# Patient Record
Sex: FEMALE | Race: OTHER | Employment: PART TIME | URBAN - METROPOLITAN AREA
[De-identification: names, ages, dates, MRNs, and addresses within clinical notes are randomized per-mention and may not be internally consistent; named-entity substitution may affect disease eponyms.]

---

## 2017-01-03 ENCOUNTER — OFFICE VISIT (OUTPATIENT)
Dept: GYNECOLOGY | Age: 47
End: 2017-01-03

## 2017-01-03 VITALS
SYSTOLIC BLOOD PRESSURE: 110 MMHG | DIASTOLIC BLOOD PRESSURE: 71 MMHG | BODY MASS INDEX: 30.48 KG/M2 | HEIGHT: 63 IN | HEART RATE: 66 BPM | WEIGHT: 172 LBS

## 2017-01-03 DIAGNOSIS — Z12.31 ENCOUNTER FOR SCREENING MAMMOGRAM FOR MALIGNANT NEOPLASM OF BREAST: Primary | ICD-10-CM

## 2017-01-03 DIAGNOSIS — C77.9 REGIONAL LYMPH NODE METASTASIS PRESENT (HCC): ICD-10-CM

## 2017-01-03 DIAGNOSIS — C54.1 ENDOMETRIAL CANCER (HCC): ICD-10-CM

## 2017-01-03 RX ORDER — LEVOTHYROXINE SODIUM 50 UG/1
TABLET ORAL
Refills: 1 | COMMUNITY
Start: 2016-12-02

## 2017-01-03 NOTE — PROGRESS NOTES
[address] [facility] [address]  [phone]  Dr. Kurt Bagley, III    Pastor Irish Peterson, [facility]   Office Note  Patient ID:  Name: Leeroy Patricia  MRM: [id number]  : 1970/46 y.o. Date: 1/3/2017    Diagnosis:     ICD-10-CM ICD-9-CM    1. Encounter for screening mammogram for malignant neoplasm of breast Z12.31 V76.12 [facility] MAMMO BI SCREENING INCL CAD   2. Endometrial cancer (HCC) C54.1 182.0    3. Regional lymph node metastasis present ([facility] [address] C77.9 196.9        Problem List:   Patient Active Problem List   Diagnosis Code    Abnormal uterine bleeding N93.9    Endometrial thickening on ultra sound R93.8    Endometrial cancer (HCC) C54.1     HISTORY:  55 y.o.  female with diagnosis of endometrial cancer. Original surgery was a TLH/BSO with peritoneal/pelvic biopsies on 11/9/15. She was optimally debulked.  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 Metastatic disease present at pelvic peritoneal biopsies. She is recommended adjuvant carboplatin/paclitaxel therapy (initiated 12/28/15) under Kurt Bagley MD.    Family Hx: Distant relative with splenic cancer.    No GI/, uterine, breast, ovarian, pancreatic cancer. Brother and sister, nieces/nephews. SUBJECTIVE:    Leeroy Patricia is a 55 y.o. female who presents for followup postoperative care following staging laparotomy, 2015,     The patient's pathology revealed   FINAL PATHOLOGIC DIAGNOSIS  1. Soft tissue, cul-de-sac nodule, excision:  Metastatic endometrial carcinoma  2.  Uterus, bilateral ovaries and fallopian tubes, hysterectomy and salpingo-oophorectomy:  Poorly differentiated endometrial carcinoma  See comment  Synoptic Report:  Specimen: Uterus, bilateral ovaries and fallopian tubes, soft tissue (cul-de-sac, peritoneum)  Procedure: Hysterectomy, bilateral salpingo-oophorectomy, peritoneal/serosal biopsies  Lymph node sampling: Not performed  Specimen integrity: Intact hysterectomy specimen  Tumor size: 6.0 cm  Histologic type: Endometrioid adenocarcinoma with extensive squamous differentiation  Histologic grade: FIGO grade 3  Myometrial invasion:  Depth of invasion: 12 mm  Myometrial thickness: 21 mm  Involvement of cervix: Not involved  Extent of involvement of other organs:  Right ovary: Involved  Left ovary: Involved  Right fallopian tube: Not involved  Left fallopian tube: Not involved  Cul-de-sac: Involved  Rectal serosa: Involved  Right and left pelvic sidewalls: Involved  Lymphovascular invasion: Not identified  Additional pathologic findings: Focal endometriosis, leiomyomas  Pathologic staging (pTNM):  Primary tumor (pT): pT3a  Regional lymph nodes (pN): pNX  Distant metastasis (M): Not applicable  3. Rectal serosa, nodule, biopsy:  Metastatic endometrial carcinoma  4. Soft tissue, right pelvic sidewall, biopsy:  Metastatic endometrial carcinoma  5. Soft tissue, left pelvic sidewall, biopsy:  Single focus of metastatic endometrial carcinoma  Comment  Stains performed on the original biopsy specimen ([id number]) show that the tumor is positive for estrogen receptor,  vimentin and CK7. In addition, it also shows strong diffuse positivity for p16. This finding raised the possibility of a  poorly differentiated endocervical adenocarcinoma; however, findings in the hysterectomy specimen show benign,  uninvolved cervix with a large 6.0 cm endometrial mass showing deep invasion into the myometrium. The majority of  the tumor shows extensive squamous differentiation with marked cellular atypia and increased mitotic activity. Focally there are areas of more conventional endometrioid endometrial carcinoma. Extensive bilateral involvement of  both ovaries is present as well as metastases in the cul-de-sac, rectal serosa and right and left pelvic sidewalls.     S/P Cycle #6/6 completed, tolerated well, peripheral neuropathy complaints? ?  S/P RT    EOD CT: 5/2016  FINDINGS:  The visualized portions of the lung bases are clear.     There are no focal abnormalities within the liver, spleen, pancreas, adrenal    glands or kidneys.     The aorta tapers without aneurysm. There is no retroperitoneal adenopathy or    mass.     The bowel is normal. The appendix is normal. There is no ascites or free    intraperitoneal air.      There is no pelvic mass or adenopathy. Patient is status post hysterectomy    nephrectomy. No adenopathy is identified.     The delayed images demonstrate good bilateral excretion of contrast into the    renal collecting systems.           IMPRESSION: No acute abnormality identified. There is no evidence of    adenopathy  Currently she has no problems with eating, bowel movements, voiding, or their wound  Appetite is good. Eating a regular diet without difficulty. Bowel movements are regular. Medications:     Current Outpatient Prescriptions on File Prior to Visit   Medication Sig Dispense Refill    acetaminophen (TYLENOL) 325 mg tablet Take 650 mg by mouth every four (4) hours as needed for Pain.  PRENATAL VIT CALC,IRON,FOLIC (PRENATAL VITAMIN PO) Take  by mouth daily.  levothyroxine (SYNTHROID) 75 mcg tablet Take 75 mcg by mouth Daily (before breakfast). No current facility-administered medications on file prior to visit. Allergies: Allergies   Allergen Reactions    Other Food Shortness of Breath and Swelling     mushrooms    Advil [Ibuprofen] Other (comments)     \"headache\"    Mushroom Combination No.1 Shortness of Breath and Swelling    Shellfish Derived Other (comments)     Dizziness headache.      Past Medical History   Diagnosis Date    Asthma      2013 had wheezing and used inhaler, no problems since    Hypothyroid     [facility]terine cancer [facility])      Past Surgical History   Procedure Laterality Date    Hx gyn  2012 \"surgery for hematoma near uterus\" laparoscopic    Hx salpingo-oophorectomy  11/2015     BILATERAL    [facility]  11/[facility]     Social History     Social History    Marital status:      Spouse name: N/A    Number of children: N/A    Years of education: N/A     Occupational History    Not on file. Social History Main Topics    Smoking status: Never Smoker    Smokeless tobacco: Never Used    Alcohol use No    Drug use: No    Sexual activity: Not on file     Other Topics Concern    Not on file     Social History Narrative       OBJECTIVE:    Vitals:   Vitals:    01/03/17 1504   BP: 110/71   Pulse: 66   Weight: 172 lb (78 kg)   Height: 5' 3\" (1.6 m)     Physical Examination:     GeneraL:  Nodes: HEENT:  alert, cooperative, no distress  Negative throughout  Normocephalic, no JVD or thyroid nodularity   Lungs: lungs clear to auscultation and percussion   Cardiac: Regular rate and rhythm   Abdomen: soft, bowel sounds active, non-tender  No CVA tenderness. No fluid wave or organomegaly   Incision: healing well   Pelvic: Exam deferred/refused. Rectal: not done   Extremity:   extremities normal, atraumatic, no cyanosis or edema     IMPRESSION:    Marquis Player is doing well   She has a working diagnosis of     ICD-10-CM ICD-9-CM    1. Encounter for screening mammogram for malignant neoplasm of breast Z12.31 V76.12 [facility] MAMMO BI SCREENING INCL CAD   2. Endometrial cancer (HCC) C54.1 182.0    3. Regional lymph node metastasis present ([facility] [address] C77.9 196.9         Medical problems:     Patient Active Problem List   Diagnosis Code    Abnormal uterine bleeding N93.9    Endometrial thickening on ultra sound R93.8    Endometrial cancer (HCC) C54.1       PLAN:    The operative procedures and clinical results have been reviewed with the patient. Implications of diagnosis discussed at length. All questions answered.    Initial Consultation/Recommendations:  I have continued to recommend a six cycle protocol of IV Carbo/Taxol in view of the metastatic nature of the patient's disease. Rationale, risks and toxicities noted. I furthermore advised the patient of the possible post-chemotherapy recommendations for pelvic ERT. S/P IV Carbo/Taxol x 6  S/P RT, finished three month ago    Return two months. Hold     Rationale, risks discussed    Return two months or PRN    Virlinda Bamberger, MD  1/3/2017/2:16 PM    Defined Sensitive Document    CC:   Omar Chapman MD   No ref.  provider found

## 2017-01-03 NOTE — PROGRESS NOTES
2 month check up, pt reports no abnormal spotting or bleeding, pt states she has no questions or concerns for today's visit

## 2017-01-03 NOTE — PATIENT INSTRUCTIONS
Source4Style Activation    Thank you for requesting access to Source4Style. Please follow the instructions below to securely access and download your online medical record. Source4Style allows you to send messages to your doctor, view your test results, renew your prescriptions, schedule appointments, and more. How Do I Sign Up? 1. In your internet browser, go to [email or web address]. 2. Click on the First Time User? Click Here link in the Sign In box. You will see the New Member Sign Up page. 3. Enter your Source4Style Access Code exactly as it appears below. You will not need to use this code after youve completed the sign-up process. If you do not sign up before the expiration date, you must request a new code. Source4Style Access Code: [email or web address]  Expires: 4/3/2017  2:54 PM (This is the date your Source4Style access code will )    4. Enter the last four digits of your Social Security Number (xxxx) and Date of Birth (mm/dd/yyyy) as indicated and click Submit. You will be taken to the next sign-up page. 5. Create a Source4Style ID. This will be your Source4Style login ID and cannot be changed, so think of one that is secure and easy to remember. 6. Create a Source4Style password. You can change your password at any time. 7. Enter your Password Reset Question and Answer. This can be used at a later time if you forget your password. 8. Enter your e-mail address. You will receive e-mail notification when new information is available in [address] 9. Click Sign Up. You can now view and download portions of your medical record. 10. Click the Download Summary menu link to download a portable copy of your medical information. Additional Information    If you have questions, please visit the Frequently Asked Questions section of the Source4Style website at [email or web address]. [employer]/. Remember, Source4Style is NOT to be used for urgent needs. For medical emergencies, dial 911.

## 2017-01-16 ENCOUNTER — APPOINTMENT (OUTPATIENT)
Dept: INFUSION THERAPY | Age: 47
End: 2017-01-16

## 2017-02-14 ENCOUNTER — TELEPHONE (OUTPATIENT)
Dept: GYNECOLOGY | Age: 47
End: 2017-02-14

## 2017-02-14 DIAGNOSIS — R92.1 BREAST CALCIFICATIONS ON MAMMOGRAM: Primary | ICD-10-CM

## 2017-02-14 NOTE — TELEPHONE ENCOUNTER
Anna from [address]  called and stated pt needs an order for a left diagnostic mmg and a left breast ultrasound for calcifications.  Faxed to [phone]

## 2017-04-04 ENCOUNTER — OFFICE VISIT (OUTPATIENT)
Dept: GYNECOLOGY | Age: 47
End: 2017-04-04

## 2017-04-04 VITALS
DIASTOLIC BLOOD PRESSURE: 63 MMHG | WEIGHT: 163.8 LBS | HEIGHT: 63 IN | BODY MASS INDEX: 29.02 KG/M2 | SYSTOLIC BLOOD PRESSURE: 108 MMHG | HEART RATE: 62 BPM

## 2017-04-04 DIAGNOSIS — C54.1 ENDOMETRIAL CA (HCC): Primary | ICD-10-CM

## 2017-04-04 NOTE — PROGRESS NOTES
3 month check up, pt complains of left sided pelvic pain that comes and goes and radiates to the back, 1/10 on pain scale

## 2017-04-04 NOTE — PROGRESS NOTES
[address], [address], [address]  [phone]  Dr. Lyly Leo, III    Dr. Jaime Monroe, [facility]   Office Note  Patient ID:  Name: Viridiana Salazar  MRM: [id number]  : 1970/47 y.o. Date: 2017    Diagnosis:     ICD-10-CM ICD-9-CM    1. Endometrial ca ([address] C54.1 182.0        Problem List:   Patient Active Problem List   Diagnosis Code    Abnormal uterine bleeding N93.9    Endometrial thickening on ultra sound R93.8    Endometrial cancer (HCC) C54.1     HISTORY:  55 y.o.  female with diagnosis of endometrial cancer. Original surgery was a TLH/BSO with peritoneal/pelvic biopsies on 11/9/15. She was optimally debulked.  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 Metastatic disease present at pelvic peritoneal biopsies. She is recommended adjuvant carboplatin/paclitaxel therapy (initiated 12/28/15) under Lyly Leo MD.    Family Hx: Distant relative with splenic cancer.    No GI/, uterine, breast, ovarian, pancreatic cancer. Brother and sister, nieces/nephews. SUBJECTIVE:    Viridiana Salazar is a 52 y.o. female who presents for followup postoperative care following staging laparotomy, 2015,     The patient's pathology revealed   FINAL PATHOLOGIC DIAGNOSIS  1. Soft tissue, cul-de-sac nodule, excision:  Metastatic endometrial carcinoma  2.  Uterus, bilateral ovaries and fallopian tubes, hysterectomy and salpingo-oophorectomy:  Poorly differentiated endometrial carcinoma  See comment  Synoptic Report:  Specimen: Uterus, bilateral ovaries and fallopian tubes, soft tissue (cul-de-sac, peritoneum)  Procedure: Hysterectomy, bilateral salpingo-oophorectomy, peritoneal/serosal biopsies  Lymph node sampling: Not performed  Specimen integrity: Intact hysterectomy specimen  Tumor size: 6.0 cm  Histologic type: Endometrioid adenocarcinoma with extensive squamous differentiation  Histologic grade: FIGO grade 3  Myometrial invasion:  Depth of invasion: 12 mm  Myometrial thickness: 21 mm  Involvement of cervix: Not involved  Extent of involvement of other organs:  Right ovary: Involved  Left ovary: Involved  Right fallopian tube: Not involved  Left fallopian tube: Not involved  Cul-de-sac: Involved  Rectal serosa: Involved  Right and left pelvic sidewalls: Involved  Lymphovascular invasion: Not identified  Additional pathologic findings: Focal endometriosis, leiomyomas  Pathologic staging (pTNM):  Primary tumor (pT): pT3a  Regional lymph nodes (pN): pNX  Distant metastasis (M): Not applicable  3. Rectal serosa, nodule, biopsy:  Metastatic endometrial carcinoma  4. Soft tissue, right pelvic sidewall, biopsy:  Metastatic endometrial carcinoma  5. Soft tissue, left pelvic sidewall, biopsy:  Single focus of metastatic endometrial carcinoma  Comment  Stains performed on the original biopsy specimen ([id number]) show that the tumor is positive for estrogen receptor,  vimentin and CK7. In addition, it also shows strong diffuse positivity for p16. This finding raised the possibility of a  poorly differentiated endocervical adenocarcinoma; however, findings in the hysterectomy specimen show benign,  uninvolved cervix with a large 6.0 cm endometrial mass showing deep invasion into the myometrium. The majority of  the tumor shows extensive squamous differentiation with marked cellular atypia and increased mitotic activity. Focally there are areas of more conventional endometrioid endometrial carcinoma. Extensive bilateral involvement of  both ovaries is present as well as metastases in the cul-de-sac, rectal serosa and right and left pelvic sidewalls. S/P Cycle #6/6 completed, tolerated well, peripheral neuropathy complaints? ?  S/P RT    EOD CT: 5/2016  FINDINGS:  The visualized portions of the lung bases are clear.     There are no focal abnormalities within the liver, spleen, pancreas, adrenal    glands or kidneys.     The aorta tapers without aneurysm. There is no retroperitoneal adenopathy or    mass.     The bowel is normal. The appendix is normal. There is no ascites or free    intraperitoneal air.      There is no pelvic mass or adenopathy. Patient is status post hysterectomy    nephrectomy. No adenopathy is identified.     The delayed images demonstrate good bilateral excretion of contrast into the    renal collecting systems.           IMPRESSION: No acute abnormality identified. There is no evidence of    adenopathy  Currently she has no problems with eating, bowel movements, voiding, or their wound  Appetite is good. Eating a regular diet without difficulty. Bowel movements are regular. The patient does complain of LUQ pain, episodic/ associated ipsilateral back pain. No fever/chills  Negative  or GI dysfunction or association. Medications:     Current Outpatient Prescriptions on File Prior to Visit   Medication Sig Dispense Refill    levothyroxine (SYNTHROID) 50 mcg tablet TAKE 1 TABLET BY MOUTH EVERY DAY  1     No current facility-administered medications on file prior to visit. Allergies: Allergies   Allergen Reactions    Other Food Shortness of Breath and Swelling     mushrooms    Advil [Ibuprofen] Other (comments)     \"headache\"    Mushroom Combination No.1 Shortness of Breath and Swelling    Shellfish Derived Other (comments)     Dizziness headache.      Past Medical History:   Diagnosis Date    Asthma     2013 had wheezing and used inhaler, no problems since    Hypothyroid     Uterine cancer [facility])      Past Surgical History:   Procedure Laterality Date    HX GYN  2012    \"surgery for hematoma near uterus\" laparoscopic    HX HYSTERECTOMY  11/2015    TLH    HX SALPINGO-OOPHORECTOMY  11/2015    BILATERAL     Social History     Social History    Marital status:      Spouse name: N/A    Number of children: N/A    Years of education: N/A     Occupational History    Not on file. Social History Main Topics    Smoking status: Never Smoker    Smokeless tobacco: Never Used    Alcohol use No    Drug use: No    Sexual activity: Not on file     Other Topics Concern    Not on file     Social History Narrative       OBJECTIVE:    Vitals:   Vitals:    04/04/17 1518   BP: 108/63   Pulse: 62   Weight: 163 lb 12.8 oz (74.3 kg)   Height: 5' 3\" (1.6 m)     Physical Examination:     GeneraL:  Nodes: HEENT:  alert, cooperative, no distress  Negative throughout  Normocephalic, no JVD or thyroid nodularity   Lungs: lungs clear to auscultation and percussion   Cardiac: Regular rate and rhythm   Abdomen: soft, bowel sounds active, non-tender  No CVA tenderness. No fluid wave or organomegaly   Incision: healing well, no hernia   Pelvic: Vulva: No gross lesion  Vagina: Narrower introitus, no palpable masses/nodularity by bimanual examination. Limited examination   Rectal: not done   Extremity:   extremities normal, atraumatic, no cyanosis or edema     IMPRESSION:    Lj De Anda is doing well   She has a working diagnosis of     ICD-10-CM ICD-9-CM    1. [facility] C54.1 182.0         Medical problems:     Patient Active Problem List   Diagnosis Code    Abnormal uterine bleeding N93.9    Endometrial thickening on ultra sound R93.8    Endometrial cancer (HCC) C54.1       PLAN:      S/P IV Carbo/Taxol x 6  S/P RT, finished three month ago    With respect to the complaints of LUQ pain/back will get EOD CT  CT to be scheduled  Followup appointment    Josephine Renee MD  4/4/2017/2:16 PM    Defined Sensitive Document    CC:   Roberth Del Cid MD   No ref.  provider found

## 2017-04-04 NOTE — PATIENT INSTRUCTIONS
FromUs Activation    Thank you for requesting access to FromUs. Please follow the instructions below to securely access and download your online medical record. FromUs allows you to send messages to your doctor, view your test results, renew your prescriptions, schedule appointments, and more. How Do I Sign Up? 1. In your internet browser, go to [email or web address]. 2. Click on the First Time User? Click Here link in the Sign In box. You will see the New Member Sign Up page. 3. Enter your FromUs Access Code exactly as it appears below. You will not need to use this code after youve completed the sign-up process. If you do not sign up before the expiration date, you must request a new code. FromUs Access Code: [email or web address]  Expires: 7/3/2017  3:14 PM (This is the date your FromUs access code will )    4. Enter the last four digits of your Social Security Number (xxxx) and Date of Birth (mm/dd/yyyy) as indicated and click Submit. You will be taken to the next sign-up page. 5. Create a FromUs ID. This will be your FromUs login ID and cannot be changed, so think of one that is secure and easy to remember. 6. Create a FromUs password. You can change your password at any time. 7. Enter your Password Reset Question and Answer. This can be used at a later time if you forget your password. 8. Enter your e-mail address. You will receive e-mail notification when new information is available in [address] 9. Click Sign Up. You can now view and download portions of your medical record. 10. Click the Download Summary menu link to download a portable copy of your medical information. Additional Information    If you have questions, please visit the Frequently Asked Questions section of the FromUs website at [email or web address]/. Remember, FromUs is NOT to be used for urgent needs. For medical emergencies, dial 911.

## 2017-04-12 ENCOUNTER — HOSPITAL ENCOUNTER (OUTPATIENT)
Dept: CT IMAGING | Age: 47
Discharge: HOME | End: 2017-04-12
Attending: OBSTETRICS & GYNECOLOGY
Payer: SELF-PAY

## 2017-04-12 DIAGNOSIS — C54.1 ENDOMETRIAL CA (HCC): ICD-10-CM

## 2017-04-12 PROCEDURE — 74011000258 HC RX REV CODE- 258: Performed by: OBSTETRICS & GYNECOLOGY

## 2017-04-12 PROCEDURE — 74011636320 HC RX REV CODE- 636/320: Performed by: OBSTETRICS & GYNECOLOGY

## 2017-04-12 PROCEDURE — 74177 CT ABD & PELVIS W/CONTRAST: CPT

## 2017-04-12 RX ORDER — SODIUM CHLORIDE 0.9 % (FLUSH) 0.9 %
10 SYRINGE (ML) INJECTION
Status: COMPLETED | OUTPATIENT
Start: 2017-04-12 | End: 2017-04-12

## 2017-04-12 RX ADMIN — SODIUM CHLORIDE 100 ML: 900 INJECTION, SOLUTION INTRAVENOUS at 17:50

## 2017-04-12 RX ADMIN — Medication 10 ML: at 17:50

## 2017-04-12 RX ADMIN — IOHEXOL 50 ML: 240 INJECTION, SOLUTION INTRATHECAL; INTRAVASCULAR; INTRAVENOUS; ORAL at 17:50

## 2017-04-12 RX ADMIN — IOPAMIDOL 100 ML: 755 INJECTION, SOLUTION INTRAVENOUS at 17:50

## 2017-04-18 ENCOUNTER — OFFICE VISIT (OUTPATIENT)
Dept: GYNECOLOGY | Age: 47
End: 2017-04-18

## 2017-04-18 VITALS
BODY MASS INDEX: 28.98 KG/M2 | SYSTOLIC BLOOD PRESSURE: 114 MMHG | HEIGHT: 63 IN | HEART RATE: 61 BPM | WEIGHT: 163.58 LBS | DIASTOLIC BLOOD PRESSURE: 74 MMHG

## 2017-04-18 DIAGNOSIS — C54.1 ENDOMETRIAL CA (HCC): Primary | ICD-10-CM

## 2017-04-18 NOTE — PROGRESS NOTES
27 [facility] Mathias Moritz [address]  [phone]  Dr. Matt Barboza, III    Dr. Allegra Weber, Akbar Head, [facility]   Office Note  Patient ID:  Name: Tommie Almanza  MRM: [id number]  : 1970/47 y.o. Date: 2017    Diagnosis:     ICD-10-CM ICD-9-CM    1. Endometrial ca ([address] C54.1 182.0        Problem List:   Patient Active Problem List   Diagnosis Code    Abnormal uterine bleeding N93.9    Endometrial thickening on ultra sound R93.8    Endometrial cancer (HCC) C54.1     HISTORY:  55 y.o.  female with diagnosis of endometrial cancer. Original surgery was a TLH/BSO with peritoneal/pelvic biopsies on 11/9/15. She was optimally debulked.  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 Metastatic disease present at pelvic peritoneal biopsies. She is recommended adjuvant carboplatin/paclitaxel therapy (initiated 12/28/15) under Matt Barboza MD.    Family Hx: Distant relative with splenic cancer.    No GI/, uterine, breast, ovarian, pancreatic cancer. Brother and sister, nieces/nephews. SUBJECTIVE:    Tommie Almanza is a 52 y.o. female who presents for followup postoperative care following staging laparotomy, 2015,     The patient's pathology revealed   FINAL PATHOLOGIC DIAGNOSIS  1. Soft tissue, cul-de-sac nodule, excision:  Metastatic endometrial carcinoma  2.  Uterus, bilateral ovaries and fallopian tubes, hysterectomy and salpingo-oophorectomy:  Poorly differentiated endometrial carcinoma  See comment  Synoptic Report:  Specimen: Uterus, bilateral ovaries and fallopian tubes, soft tissue (cul-de-sac, peritoneum)  Procedure: Hysterectomy, bilateral salpingo-oophorectomy, peritoneal/serosal biopsies  Lymph node sampling: Not performed  Specimen integrity: Intact hysterectomy specimen  Tumor size: 6.0 cm  Histologic type: Endometrioid adenocarcinoma with extensive squamous differentiation  Histologic grade: FIGO grade 3  Myometrial invasion:  Depth of invasion: 12 mm  Myometrial thickness: 21 mm  Involvement of cervix: Not involved  Extent of involvement of other organs:  Right ovary: Involved  Left ovary: Involved  Right fallopian tube: Not involved  Left fallopian tube: Not involved  Cul-de-sac: Involved  Rectal serosa: Involved  Right and left pelvic sidewalls: Involved  Lymphovascular invasion: Not identified  Additional pathologic findings: Focal endometriosis, leiomyomas  Pathologic staging (pTNM):  Primary tumor (pT): pT3a  Regional lymph nodes (pN): pNX  Distant metastasis (M): Not applicable  3. Rectal serosa, nodule, biopsy:  Metastatic endometrial carcinoma  4. Soft tissue, right pelvic sidewall, biopsy:  Metastatic endometrial carcinoma  5. Soft tissue, left pelvic sidewall, biopsy:  Single focus of metastatic endometrial carcinoma  Comment  Stains performed on the original biopsy specimen ([id number]) show that the tumor is positive for estrogen receptor,  vimentin and CK7. In addition, it also shows strong diffuse positivity for p16. This finding raised the possibility of a  poorly differentiated endocervical adenocarcinoma; however, findings in the hysterectomy specimen show benign,  uninvolved cervix with a large 6.0 cm endometrial mass showing deep invasion into the myometrium. The majority of  the tumor shows extensive squamous differentiation with marked cellular atypia and increased mitotic activity. Focally there are areas of more conventional endometrioid endometrial carcinoma. Extensive bilateral involvement of  both ovaries is present as well as metastases in the cul-de-sac, rectal serosa and right and left pelvic sidewalls. S/P Cycle #6/6 completed, tolerated well, peripheral neuropathy complaints? ?  S/P RT  EOD CT:   3/2017   BRONSON    Currently she has no problems with eating, bowel movements, voiding, or their wound  Appetite is good. Eating a regular diet without difficulty. Bowel movements are regular. The patient does complain of LUQ pain, episodic/ associated ipsilateral back pain. No fever/chills  Negative  or GI dysfunction or association. Medications:     Current Outpatient Prescriptions on File Prior to Visit   Medication Sig Dispense Refill    levothyroxine (SYNTHROID) 50 mcg tablet TAKE 1 TABLET BY MOUTH EVERY DAY  1     No current facility-administered medications on file prior to visit. Allergies: Allergies   Allergen Reactions    Other Food Shortness of Breath and Swelling     mushrooms    Advil [Ibuprofen] Other (comments)     \"headache\"    Mushroom Combination No.1 Shortness of Breath and Swelling    Shellfish Derived Other (comments)     Dizziness headache. Past Medical History:   Diagnosis Date    Asthma     2013 had wheezing and used inhaler, no problems since    Hypothyroid     Uterine cancer [facility])      Past Surgical History:   Procedure Laterality Date    HX GYN  2012    \"surgery for hematoma near uterus\" laparoscopic    HX HYSTERECTOMY  11/2015    TLH    HX SALPINGO-OOPHORECTOMY  11/2015    BILATERAL     Social History     Social History    Marital status:      Spouse name: N/A    Number of children: N/A    Years of education: N/A     Occupational History    Not on file. Social History Main Topics    Smoking status: Never Smoker    Smokeless tobacco: Never Used    Alcohol use No    Drug use: No    Sexual activity: Not on file     Other Topics Concern    Not on file     Social History Narrative       OBJECTIVE:    Vitals:   Vitals:    04/18/17 1543   BP: 114/74   Pulse: 61   Weight: 163 lb 9.3 oz (74.2 kg)   Height: 5' 3\" (1.6 m)     Physical Examination:     GeneraL:  Nodes:   HEENT:  alert, cooperative, no distress  Negative throughout  Normocephalic, no JVD or thyroid nodularity   Lungs: lungs clear to auscultation and percussion   Cardiac: Regular rate and rhythm   Abdomen: soft, bowel sounds active, non-tender  No CVA tenderness. No fluid wave or organomegaly   Incision: healing well, no hernia   Pelvic: Vulva: No gross lesion  Vagina: Narrower introitus, no palpable masses/nodularity by bimanual examination. Limited examination   Rectal: not done   Extremity:   extremities normal, atraumatic, no cyanosis or edema     IMPRESSION:    Matias Alessia is doing well   She has a working diagnosis of     ICD-10-CM ICD-9-CM    1. Endometrial ca ([address] C54.1 182.0         Medical problems:     Patient Active Problem List   Diagnosis Code    Abnormal uterine bleeding N93.9    Endometrial thickening on ultra sound R93.8    Endometrial cancer (HCC) C54.1       PLAN:      S/P IV Carbo/Taxol x 6  S/P RT, finished three month ago    CT: BRONSON  Return 2 months or PRN  EOD scan PRN    Jeannie Landsi MD  4/18/2017/2:16 PM    Defined Sensitive Document    CC:   Dru Jason MD   No ref.  provider found

## 2017-06-20 ENCOUNTER — OFFICE VISIT (OUTPATIENT)
Dept: GYNECOLOGY | Age: 47
End: 2017-06-20

## 2017-06-20 VITALS
BODY MASS INDEX: 28.74 KG/M2 | HEART RATE: 77 BPM | DIASTOLIC BLOOD PRESSURE: 64 MMHG | HEIGHT: 63 IN | WEIGHT: 162.2 LBS | SYSTOLIC BLOOD PRESSURE: 93 MMHG

## 2017-06-20 DIAGNOSIS — Z85.42 HISTORY OF ENDOMETRIAL CANCER: Primary | ICD-10-CM

## 2017-06-20 NOTE — PROGRESS NOTES
27 [facility] Mathias Moritz [address], [address]  [phone]  Dr. Bladimir Jacobs, III    Dr. Arnol Oconnell, Olivia Garcia, [facility]   Office Note  Patient ID:  Name: Karey Trujillo  MRM: [id number]  : 1970/47 y.o. Date: 2017    Diagnosis:     ICD-10-CM ICD-9-CM    1. History of endometrial cancer Z85.42 V10.42        Problem List:   Patient Active Problem List   Diagnosis Code    Abnormal uterine bleeding N93.9    Endometrial thickening on ultra sound R93.8    Endometrial cancer (HCC) C54.1     HISTORY:  55 y.o.  female with diagnosis of endometrial cancer. Original surgery was a TLH/BSO with peritoneal/pelvic biopsies on 11/9/15. She was optimally debulked.  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 Metastatic disease present at pelvic peritoneal biopsies. She is recommended adjuvant carboplatin/paclitaxel therapy (initiated 12/28/15) under Bladimir Jacobs MD.    Family Hx: Distant relative with splenic cancer.    No GI/, uterine, breast, ovarian, pancreatic cancer. Brother and sister, nieces/nephews. SUBJECTIVE:    Karey Trujillo is a 52 y.o. female who presents for followup  care following staging laparotomy, 2015,     The patient's pathology revealed   FINAL PATHOLOGIC DIAGNOSIS  1. Soft tissue, cul-de-sac nodule, excision:  Metastatic endometrial carcinoma  2.  Uterus, bilateral ovaries and fallopian tubes, hysterectomy and salpingo-oophorectomy:  Poorly differentiated endometrial carcinoma  See comment  Synoptic Report:  Specimen: Uterus, bilateral ovaries and fallopian tubes, soft tissue (cul-de-sac, peritoneum)  Procedure: Hysterectomy, bilateral salpingo-oophorectomy, peritoneal/serosal biopsies  Lymph node sampling: Not performed  Specimen integrity: Intact hysterectomy specimen  Tumor size: 6.0 cm  Histologic type: Endometrioid adenocarcinoma with extensive squamous differentiation  Histologic grade: FIGO grade 3  Myometrial invasion:  Depth of invasion: 12 mm  Myometrial thickness: 21 mm  Involvement of cervix: Not involved  Extent of involvement of other organs:  Right ovary: Involved  Left ovary: Involved  Right fallopian tube: Not involved  Left fallopian tube: Not involved  Cul-de-sac: Involved  Rectal serosa: Involved  Right and left pelvic sidewalls: Involved  Lymphovascular invasion: Not identified  Additional pathologic findings: Focal endometriosis, leiomyomas  Pathologic staging (pTNM):  Primary tumor (pT): pT3a  Regional lymph nodes (pN): pNX  Distant metastasis (M): Not applicable  3. Rectal serosa, nodule, biopsy:  Metastatic endometrial carcinoma  4. Soft tissue, right pelvic sidewall, biopsy:  Metastatic endometrial carcinoma  5. Soft tissue, left pelvic sidewall, biopsy:  Single focus of metastatic endometrial carcinoma  Comment  Stains performed on the original biopsy specimen ([id number]) show that the tumor is positive for estrogen receptor,  vimentin and CK7. In addition, it also shows strong diffuse positivity for p16. This finding raised the possibility of a  poorly differentiated endocervical adenocarcinoma; however, findings in the hysterectomy specimen show benign,  uninvolved cervix with a large 6.0 cm endometrial mass showing deep invasion into the myometrium. The majority of  the tumor shows extensive squamous differentiation with marked cellular atypia and increased mitotic activity. Focally there are areas of more conventional endometrioid endometrial carcinoma. Extensive bilateral involvement of  both ovaries is present as well as metastases in the cul-de-sac, rectal serosa and right and left pelvic sidewalls. S/P Cycle #6/6 completed, tolerated well, peripheral neuropathy complaints? ?  S/P RT  EOD CT:   3/2017   BRONSON    6/20/2017:  Currently she has no problems with eating, bowel movements, voiding, or their wound  Appetite is good. Eating a regular diet without difficulty. Bowel movements are regular. The patient does complain of LUQ pain, episodic/ associated ipsilateral back pain. No fever/chills  Negative  or GI dysfunction or association. Medications:     Current Outpatient Prescriptions on File Prior to Visit   Medication Sig Dispense Refill    levothyroxine (SYNTHROID) 50 mcg tablet TAKE 1 TABLET BY MOUTH EVERY DAY  1     No current facility-administered medications on file prior to visit. Allergies: Allergies   Allergen Reactions    Other Food Shortness of Breath and Swelling     mushrooms    Advil [Ibuprofen] Other (comments)     \"headache\"    Mushroom Combination No.1 Shortness of Breath and Swelling    Shellfish Derived Other (comments)     Dizziness headache. Past Medical History:   Diagnosis Date    Asthma     2013 had wheezing and used inhaler, no problems since    Hypothyroid     Uterine cancer [facility])      Past Surgical History:   Procedure Laterality Date    HX GYN  2012    \"surgery for hematoma near uterus\" laparoscopic    HX HYSTERECTOMY  11/2015    TLH    HX SALPINGO-OOPHORECTOMY  11/2015    BILATERAL     Social History     Social History    Marital status:      Spouse name: N/A    Number of children: N/A    Years of education: N/A     Occupational History    Not on file. Social History Main Topics    Smoking status: Never Smoker    Smokeless tobacco: Never Used    Alcohol use No    Drug use: No    Sexual activity: Not on file     Other Topics Concern    Not on file     Social History Narrative       OBJECTIVE:    Vitals:   Vitals:    06/20/17 1520   BP: 93/64   Pulse: 77   Weight: 162 lb 3.2 oz (73.6 kg)   Height: 5' 3\" (1.6 m)     Physical Examination:     GeneraL:  Nodes:   HEENT:  alert, cooperative, no distress  Negative throughout  Normocephalic, no JVD or thyroid nodularity   Lungs: lungs clear to auscultation and percussion   Cardiac: Regular rate and rhythm   Abdomen: soft, bowel sounds active, non-tender  No CVA tenderness. No fluid wave or organomegaly   Incision: healed well, no hernia   Pelvic: Refused   Rectal: not done   Extremity:   extremities normal, atraumatic, no cyanosis or edema     IMPRESSION:    Qamar Long is doing well   She has a working diagnosis of     ICD-10-CM ICD-9-CM    1. History of endometrial cancer Z85.42 V10.42         Medical problems:     Patient Active Problem List   Diagnosis Code    Abnormal uterine bleeding N93.9    Endometrial thickening on ultra sound R93.8    Endometrial cancer (HCC) C54.1       PLAN:      S/P IV Carbo/Taxol x 6  S/P RT    CT: BRONSON  Return 2-3months or PRN  EOD scan PRN    Billy Avalos MD  6/20/2017/2:16 PM    Defined Sensitive Document    CC:   Orlando Lucas MD   No ref.  provider found

## 2017-06-20 NOTE — PROGRESS NOTES
2 month check up, pt states she has no questions or concerns for today's visit, pt reports no abnormal spotting or bleeding

## 2017-09-05 ENCOUNTER — OFFICE VISIT (OUTPATIENT)
Dept: GYNECOLOGY | Age: 47
End: 2017-09-05

## 2017-09-05 VITALS
HEART RATE: 59 BPM | BODY MASS INDEX: 29.06 KG/M2 | SYSTOLIC BLOOD PRESSURE: 104 MMHG | DIASTOLIC BLOOD PRESSURE: 70 MMHG | WEIGHT: 164 LBS | HEIGHT: 63 IN

## 2017-09-05 DIAGNOSIS — C54.1 ENDOMETRIAL CANCER (HCC): Primary | ICD-10-CM

## 2017-09-05 NOTE — PROGRESS NOTES
27 [facility] Mathias Moritz [address], [address]  [phone]  Dr. Ethel Patten, III    Dr. Michail Schaumann, [facility], [facility]   Office Note  Patient ID:  Name: Dao Sims  MRM: [id number]  : 1970/47 y.o. Date: 2017    Diagnosis:     ICD-10-CM ICD-9-CM    1. Endometrial cancer ([address] C54.1 182.0        Problem List:   Patient Active Problem List   Diagnosis Code    Abnormal uterine bleeding N93.9    Endometrial thickening on ultra sound R93.8    Endometrial cancer (HCC) C54.1     HISTORY:  55 y.o.  female with diagnosis of endometrial cancer. Original surgery was a TLH/BSO with peritoneal/pelvic biopsies on 11/9/15. She was optimally debulked.  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 Metastatic disease present at pelvic peritoneal biopsies. She is recommended adjuvant carboplatin/paclitaxel therapy (initiated 12/28/15) under Ethel Patten MD.    Family Hx: Distant relative with splenic cancer.    No GI/, uterine, breast, ovarian, pancreatic cancer. Brother and sister, nieces/nephews. SUBJECTIVE:    Dao Sims is a 52 y.o. female who presents for followup  care following staging laparotomy, 2015,     The patient's pathology revealed   FINAL PATHOLOGIC DIAGNOSIS  1. Soft tissue, cul-de-sac nodule, excision:  Metastatic endometrial carcinoma  2.  Uterus, bilateral ovaries and fallopian tubes, hysterectomy and salpingo-oophorectomy:  Poorly differentiated endometrial carcinoma  See comment  Synoptic Report:  Specimen: Uterus, bilateral ovaries and fallopian tubes, soft tissue (cul-de-sac, peritoneum)  Procedure: Hysterectomy, bilateral salpingo-oophorectomy, peritoneal/serosal biopsies  Lymph node sampling: Not performed  Specimen integrity: Intact hysterectomy specimen  Tumor size: 6.0 cm  Histologic type: Endometrioid adenocarcinoma with extensive squamous differentiation  Histologic grade: FIGO grade 3  Myometrial invasion:  Depth of invasion: 12 mm  Myometrial thickness: 21 mm  Involvement of cervix: Not involved  Extent of involvement of other organs:  Right ovary: Involved  Left ovary: Involved  Right fallopian tube: Not involved  Left fallopian tube: Not involved  Cul-de-sac: Involved  Rectal serosa: Involved  Right and left pelvic sidewalls: Involved  Lymphovascular invasion: Not identified  Additional pathologic findings: Focal endometriosis, leiomyomas  Pathologic staging (pTNM):  Primary tumor (pT): pT3a  Regional lymph nodes (pN): pNX  Distant metastasis (M): Not applicable  3. Rectal serosa, nodule, biopsy:  Metastatic endometrial carcinoma  4. Soft tissue, right pelvic sidewall, biopsy:  Metastatic endometrial carcinoma  5. Soft tissue, left pelvic sidewall, biopsy:  Single focus of metastatic endometrial carcinoma  Comment  Stains performed on the original biopsy specimen ([id number]) show that the tumor is positive for estrogen receptor,  vimentin and CK7. In addition, it also shows strong diffuse positivity for p16. This finding raised the possibility of a  poorly differentiated endocervical adenocarcinoma; however, findings in the hysterectomy specimen show benign,  uninvolved cervix with a large 6.0 cm endometrial mass showing deep invasion into the myometrium. The majority of  the tumor shows extensive squamous differentiation with marked cellular atypia and increased mitotic activity. Focally there are areas of more conventional endometrioid endometrial carcinoma. Extensive bilateral involvement of  both ovaries is present as well as metastases in the cul-de-sac, rectal serosa and right and left pelvic sidewalls. S/P Cycle #6/6 completed, tolerated well, peripheral neuropathy complaints? ?  S/P RT  EOD CT:   3/2017   BRONSON    6/20/2017:  Currently she has no problems with eating, bowel movements, voiding, or their wound  Appetite is good. Eating a regular diet without difficulty. Bowel movements are regular. The patient does complain of LUQ pain, episodic/ associated ipsilateral back pain. No fever/chills  Negative  or GI dysfunction or association. Medications:     Current Outpatient Prescriptions on File Prior to Visit   Medication Sig Dispense Refill    GLUCOSAMINE SULFATE (GLUCOSAMINE PO) Take  by mouth.  levothyroxine (SYNTHROID) 50 mcg tablet TAKE 1 TABLET BY MOUTH EVERY DAY  1     No current facility-administered medications on file prior to visit. Allergies: Allergies   Allergen Reactions    Other Food Shortness of Breath and Swelling     mushrooms    Advil [Ibuprofen] Other (comments)     \"headache\"    Mushroom Combination No.1 Shortness of Breath and Swelling    Shellfish Derived Other (comments)     Dizziness headache. Past Medical History:   Diagnosis Date    Asthma     2013 had wheezing and used inhaler, no problems since    Hypothyroid     Uterine cancer [facility])      Past Surgical History:   Procedure Laterality Date    HX GYN  2012    \"surgery for hematoma near uterus\" laparoscopic    HX HYSTERECTOMY  11/2015    TLH    HX SALPINGO-OOPHORECTOMY  11/2015    BILATERAL     Social History     Social History    Marital status:      Spouse name: N/A    Number of children: N/A    Years of education: N/A     Occupational History    Not on file. Social History Main Topics    Smoking status: Never Smoker    Smokeless tobacco: Never Used    Alcohol use No    Drug use: No    Sexual activity: Not on file     Other Topics Concern    Not on file     Social History Narrative       OBJECTIVE:    Vitals:   Vitals:    09/05/17 1457   BP: 104/70   Pulse: (!) 59   Weight: 164 lb (74.4 kg)   Height: 5' 3\" (1.6 m)     Physical Examination:     GeneraL:  Nodes:   HEENT:  alert, cooperative, no distress  Negative throughout  Normocephalic, no JVD or thyroid nodularity   Lungs: lungs clear to auscultation and percussion   Cardiac: Regular rate and rhythm   Abdomen: soft, bowel sounds active, non-tender  No CVA tenderness. No fluid wave or organomegaly   Incision: healed well, no hernia   Pelvic: Refused   Rectal: not done   Extremity:   extremities normal, atraumatic, no cyanosis or edema     IMPRESSION:    Glenora Littlefork is doing well   She has a working diagnosis of     ICD-10-CM ICD-9-CM    1. Endometrial cancer ([address] C54.1 182.0         Medical problems:     Patient Active Problem List   Diagnosis Code    Abnormal uterine bleeding N93.9    Endometrial thickening on ultra sound R93.8    Endometrial cancer (HCC) C54.1       PLAN:      S/P IV Carbo/Taxol x 6  S/P RT    CT: BRONSON  Return 3-4 months or PRN  EOD scan PRN    Gurpreet Avelar MD  9/5/2017/2:16 PM    Defined Sensitive Document    CC:   Shaw Bell MD   No ref.  provider found

## 2017-09-05 NOTE — PATIENT INSTRUCTIONS
MindBites Activation    Thank you for requesting access to [employer]. Please follow the instructions below to securely access and download your online medical record. [employer]ites allows you to send messages to your doctor, view your test results, renew your prescriptions, schedule appointments, and more. How Do I Sign Up? 1. In your internet browser, go to [email or web address]. 2. Click on the First Time User? Click Here link in the Sign In box. You will see the New Member Sign Up page. 3. Enter your MindBites Access Code exactly as it appears below. You will not need to use this code after youve completed the sign-up process. If you do not sign up before the expiration date, you must request a new code. MindBites Access Code: [email or web address]  Expires: 2017  2:55 PM (This is the date your MindBites access code will )    4. Enter the last four digits of your Social Security Number (xxxx) and Date of Birth (mm/dd/yyyy) as indicated and click Submit. You will be taken to the next sign-up page. 5. Create a MindBites ID. This will be your MindBites login ID and cannot be changed, so think of one that is secure and easy to remember. 6. Create a MindBites password. You can change your password at any time. 7. Enter your Password Reset Question and Answer. This can be used at a later time if you forget your password. 8. Enter your e-mail address. You will receive e-mail notification when new information is available in [address] 9. Click Sign Up. You can now view and download portions of your medical record. 10. Click the Download Summary menu link to download a portable copy of your medical information. Additional Information    If you have questions, please visit the Frequently Asked Questions section of the MindBites website at [email or web address] Remember, [employer]ites is NOT to be used for urgent needs. For medical emergencies, dial 911.

## 2017-12-26 ENCOUNTER — OFFICE VISIT (OUTPATIENT)
Dept: GYNECOLOGY | Age: 47
End: 2017-12-26

## 2017-12-26 ENCOUNTER — HOSPITAL ENCOUNTER (OUTPATIENT)
Dept: LAB | Age: 47
Discharge: HOME | End: 2017-12-26
Payer: COMMERCIAL

## 2017-12-26 VITALS
SYSTOLIC BLOOD PRESSURE: 108 MMHG | HEART RATE: 54 BPM | WEIGHT: 170.8 LBS | HEIGHT: 63 IN | BODY MASS INDEX: 30.26 KG/M2 | DIASTOLIC BLOOD PRESSURE: 70 MMHG

## 2017-12-26 DIAGNOSIS — Z12.31 ENCOUNTER FOR SCREENING MAMMOGRAM FOR MALIGNANT NEOPLASM OF BREAST: Primary | ICD-10-CM

## 2017-12-26 PROCEDURE — 88142 CYTOPATH C/V THIN LAYER: CPT | Performed by: OBSTETRICS & GYNECOLOGY

## 2017-12-26 NOTE — PROGRESS NOTES
3 month check up, pt reports no abnormal spotting or bleeding, pt states she has no questions or concerns for today's visit

## 2017-12-26 NOTE — PROGRESS NOTES
27 [facility] [address], [address]  26 [phone]  Dr. Fly Davis, III    Dr. Brandon Puckett, [facility], [facility]   Office Note  Patient ID:  Name: Josephine Kruger  MRM: [id number]  : 1970/47 y.o. Date: 2017    Diagnosis:     ICD-10-CM ICD-9-CM    1. Encounter for screening mammogram for malignant neoplasm of breast Z12.31 V76.12 [facility] MAMMO BI SCREENING INCL CAD      PAP, LIQUID BASED, MANUAL SCREEN       Problem List:   Patient Active Problem List   Diagnosis Code    Abnormal uterine bleeding N93.9    Endometrial thickening on ultra sound R93.8    Endometrial cancer (HCC) C54.1     HISTORY:  55 y.o.  female with diagnosis of endometrial cancer. Original surgery was a TLH/BSO with peritoneal/pelvic biopsies on 11/9/15. She was optimally debulked.  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 Metastatic disease present at pelvic peritoneal biopsies. She is recommended adjuvant carboplatin/paclitaxel therapy (initiated 12/28/15) under Fly Davis MD.  S/P IV Carbo/Taxol x 6  S/P Pelvic RT    Family Hx: Distant relative with splenic cancer.    No GI/, uterine, breast, ovarian, pancreatic cancer. Brother and sister, nieces/nephews. SUBJECTIVE:    Josephine Kruger is a 52 y.o. female who presents for followup  care following staging laparotomy, 2015,     The patient's pathology revealed   FINAL PATHOLOGIC DIAGNOSIS  1. Soft tissue, cul-de-sac nodule, excision:  Metastatic endometrial carcinoma  2.  Uterus, bilateral ovaries and fallopian tubes, hysterectomy and salpingo-oophorectomy:  Poorly differentiated endometrial carcinoma  See comment  Synoptic Report:  Specimen: Uterus, bilateral ovaries and fallopian tubes, soft tissue (cul-de-sac, peritoneum)  Procedure: Hysterectomy, bilateral salpingo-oophorectomy, peritoneal/serosal biopsies  Lymph node sampling: Not performed  Specimen integrity: Intact hysterectomy specimen  Tumor size: 6.0 cm  Histologic type: Endometrioid adenocarcinoma with extensive squamous differentiation  Histologic grade: FIGO grade 3  Myometrial invasion:  Depth of invasion: 12 mm  Myometrial thickness: 21 mm  Involvement of cervix: Not involved  Extent of involvement of other organs:  Right ovary: Involved  Left ovary: Involved  Right fallopian tube: Not involved  Left fallopian tube: Not involved  Cul-de-sac: Involved  Rectal serosa: Involved  Right and left pelvic sidewalls: Involved  Lymphovascular invasion: Not identified  Additional pathologic findings: Focal endometriosis, leiomyomas  Pathologic staging (pTNM):  Primary tumor (pT): pT3a  Regional lymph nodes (pN): pNX  Distant metastasis (M): Not applicable  3. Rectal serosa, nodule, biopsy:  Metastatic endometrial carcinoma  4. Soft tissue, right pelvic sidewall, biopsy:  Metastatic endometrial carcinoma  5. Soft tissue, left pelvic sidewall, biopsy:  Single focus of metastatic endometrial carcinoma  Comment  Stains performed on the original biopsy specimen ([id number]) show that the tumor is positive for estrogen receptor,  vimentin and CK7. In addition, it also shows strong diffuse positivity for p16. This finding raised the possibility of a  poorly differentiated endocervical adenocarcinoma; however, findings in the hysterectomy specimen show benign,  uninvolved cervix with a large 6.0 cm endometrial mass showing deep invasion into the myometrium. The majority of  the tumor shows extensive squamous differentiation with marked cellular atypia and increased mitotic activity. Focally there are areas of more conventional endometrioid endometrial carcinoma. Extensive bilateral involvement of  both ovaries is present as well as metastases in the cul-de-sac, rectal serosa and right and left pelvic sidewalls.     S/P Cycle #6/6 completed, tolerated well, peripheral neuropathy complaints? ?  S/P RT  EOD CT:   3/2017   [facility]    12/26/2017:  Currently she has no problems with eating, bowel movements, voiding, or their wound  Appetite is good. Eating a regular diet without difficulty. Bowel movements are regular. The patient does complain of LUQ pain, episodic/ associated ipsilateral back pain. No fever/chills  Negative  or GI dysfunction or association. Medications:     Current Outpatient Prescriptions on File Prior to Visit   Medication Sig Dispense Refill    GLUCOSAMINE SULFATE (GLUCOSAMINE PO) Take  by mouth.  levothyroxine (SYNTHROID) 50 mcg tablet TAKE 1 TABLET BY MOUTH EVERY DAY  1     No current facility-administered medications on file prior to visit. Allergies: Allergies   Allergen Reactions    Other Food Shortness of Breath and Swelling     mushrooms    Advil [Ibuprofen] Other (comments)     \"headache\"    Mushroom Combination No.1 Shortness of Breath and Swelling    Shellfish Derived Other (comments)     Dizziness headache. Past Medical History:   Diagnosis Date    Asthma     2013 had wheezing and used inhaler, no problems since    Hypothyroid     Uterine cancer [facility])      Past Surgical History:   Procedure Laterality Date    HX GYN  2012    \"surgery for hematoma near uterus\" laparoscopic    HX HYSTERECTOMY  11/2015    TLH    HX SALPINGO-OOPHORECTOMY  11/2015    BILATERAL     Social History     Social History    Marital status:      Spouse name: N/A    Number of children: N/A    Years of education: N/A     Occupational History    Not on file.      Social History Main Topics    Smoking status: Never Smoker    Smokeless tobacco: Never Used    Alcohol use No    Drug use: No    Sexual activity: Not on file     Other Topics Concern    Not on file     Social History Narrative       OBJECTIVE:    Vitals:   Vitals:    12/26/17 1557   BP: 108/70   Pulse: (!) 54   Weight: 170 lb 12.8 oz (77.5 kg)   Height: 5' 3\" (1.6 m)     Physical Examination:     GeneraL:  Nodes: HEENT:  alert, cooperative, no distress  Negative throughout  Normocephalic, no JVD or thyroid nodularity   Lungs: lungs clear to auscultation and percussion   Cardiac: Regular rate and rhythm   Abdomen: soft, bowel sounds active, non-tender  No CVA tenderness. No fluid wave or organomegaly   Incision: healed well, no hernia   Pelvic: Vulva: No lesion, BUS negative  Vagina: Smooth, no suspicious induration or nodularity. Cytology taken  Bimanual: No suspicious masses, induration or tenderness. Rectal: not done   Extremity:   extremities normal, atraumatic, no cyanosis or edema     IMPRESSION:    Denis Abbasi is doing well   She has a working diagnosis of     ICD-10-CM ICD-9-CM    1. Encounter for screening mammogram for malignant neoplasm of breast Z12.31 V76.12 [facility] MAMMO BI SCREENING INCL CAD      PAP, LIQUID BASED, MANUAL SCREEN        Medical problems:     Patient Active Problem List   Diagnosis Code    Abnormal uterine bleeding N93.9    Endometrial thickening on ultra sound R93.8    Endometrial cancer (HCC) C54.1       PLAN:      S/P IV Carbo/Taxol x 6  S/P RT  Cytology taken    CT: [facility]  Return 3-4 months or PRN  EOD scan PRN    Shad Max MD  12/26/2017/2:16 PM    Defined Sensitive Document    CC:   Omar Chapman MD   No ref.  provider found

## 2018-01-15 NOTE — PROGRESS NOTES
Patient:   Danish Pantoja  SSN: [id number]  : 1970    Date:    1/15/2018    Ms. Joelle Browne cytology/Pap smear has been interpreted as within normal limts. I would ask that subsequent Pap smears be performed at the interval discussed at the last office visit.     If there are any questions please do not hesitate to contact our offices [phone] Srini Martinez MD

## 2018-03-27 ENCOUNTER — OFFICE VISIT (OUTPATIENT)
Dept: GYNECOLOGY | Age: 48
End: 2018-03-27

## 2018-03-27 VITALS
HEIGHT: 63 IN | DIASTOLIC BLOOD PRESSURE: 63 MMHG | SYSTOLIC BLOOD PRESSURE: 111 MMHG | WEIGHT: 170 LBS | BODY MASS INDEX: 30.12 KG/M2 | HEART RATE: 55 BPM

## 2018-03-27 DIAGNOSIS — R13.19 ESOPHAGEAL DYSPHAGIA: ICD-10-CM

## 2018-03-27 DIAGNOSIS — R06.00 DYSPNEA, UNSPECIFIED TYPE: ICD-10-CM

## 2018-03-27 DIAGNOSIS — C54.1 ENDOMETRIAL CANCER (HCC): Primary | ICD-10-CM

## 2018-03-27 NOTE — PROGRESS NOTES
27 [facility] [address]  26 [phone]  Dr. Amanda Real, III    [facility], [facility]   Office Note  Patient ID:  Name: Stephanie Dockery  MRM: [id number]  : 1970/48 y.o. Date: 3/27/2018    Diagnosis:     ICD-10-CM ICD-9-CM    1. Endometrial cancer ([facility] [address] C54.1 182.0        Problem List:   Patient Active Problem List   Diagnosis Code    Abnormal uterine bleeding N93.9    Endometrial thickening on ultra sound R93.8    Endometrial cancer (HCC) C54.1     HISTORY:  55 y.o.  female with diagnosis of endometrial cancer. Original surgery was a TLH/BSO with peritoneal/pelvic biopsies on 11/9/15. She was optimally debulked.  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 Metastatic disease present at pelvic peritoneal biopsies. She is recommended adjuvant carboplatin/paclitaxel therapy (initiated 12/28/15) under Amanda Real MD.  S/P IV Carbo/Taxol x 6  S/P Pelvic RT    Family Hx: Distant relative with splenic cancer.    No GI/, uterine, breast, ovarian, pancreatic cancer. Brother and sister, nieces/nephews. SUBJECTIVE:    Stephanie Dockery is a 50 y.o. female who presents for followup  care following staging laparotomy, 2015,     The patient's pathology revealed   FINAL PATHOLOGIC DIAGNOSIS  1. Soft tissue, cul-de-sac nodule, excision:  Metastatic endometrial carcinoma  2.  Uterus, bilateral ovaries and fallopian tubes, hysterectomy and salpingo-oophorectomy:  Poorly differentiated endometrial carcinoma  See comment  Synoptic Report:  Specimen: Uterus, bilateral ovaries and fallopian tubes, soft tissue (cul-de-sac, peritoneum)  Procedure: Hysterectomy, bilateral salpingo-oophorectomy, peritoneal/serosal biopsies  Lymph node sampling: Not performed  Specimen integrity: Intact hysterectomy specimen  Tumor size: 6.0 cm  Histologic type: Endometrioid adenocarcinoma with extensive squamous differentiation  Histologic grade: FIGO grade 3  Myometrial invasion:  Depth of invasion: 12 mm  Myometrial thickness: 21 mm  Involvement of cervix: Not involved  Extent of involvement of other organs:  Right ovary: Involved  Left ovary: Involved  Right fallopian tube: Not involved  Left fallopian tube: Not involved  Cul-de-sac: Involved  Rectal serosa: Involved  Right and left pelvic sidewalls: Involved  Lymphovascular invasion: Not identified  Additional pathologic findings: Focal endometriosis, leiomyomas  Pathologic staging (pTNM):  Primary tumor (pT): pT3a  Regional lymph nodes (pN): pNX  Distant metastasis (M): Not applicable  3. Rectal serosa, nodule, biopsy:  Metastatic endometrial carcinoma  4. Soft tissue, right pelvic sidewall, biopsy:  Metastatic endometrial carcinoma  5. Soft tissue, left pelvic sidewall, biopsy:  Single focus of metastatic endometrial carcinoma  Comment  Stains performed on the original biopsy specimen ([id number]) show that the tumor is positive for estrogen receptor,  vimentin and CK7. In addition, it also shows strong diffuse positivity for p16. This finding raised the possibility of a  poorly differentiated endocervical adenocarcinoma; however, findings in the hysterectomy specimen show benign,  uninvolved cervix with a large 6.0 cm endometrial mass showing deep invasion into the myometrium. The majority of  the tumor shows extensive squamous differentiation with marked cellular atypia and increased mitotic activity. Focally there are areas of more conventional endometrioid endometrial carcinoma. Extensive bilateral involvement of  both ovaries is present as well as metastases in the cul-de-sac, rectal serosa and right and left pelvic sidewalls. S/P Cycle #6/6 completed, tolerated well, peripheral neuropathy complaints? ?  S/P RT      3/27/2018:  Currently she is having problems with eating--dysphagia, gagging.  normal bowel movements, voiding without symptoms. Appetite is good but dysphagia limits PO intake and supine positioning. Bowel movements are regular. The patient does complain of LUQ pain, episodic/ associated ipsilateral back pain. No fever/chills  Negative  or GI dysfunction or association. Medications:     Current Outpatient Prescriptions on File Prior to Visit   Medication Sig Dispense Refill    GLUCOSAMINE SULFATE (GLUCOSAMINE PO) Take  by mouth.  levothyroxine (SYNTHROID) 50 mcg tablet TAKE 1 TABLET BY MOUTH EVERY DAY  1     No current facility-administered medications on file prior to visit. Allergies: Allergies   Allergen Reactions    Other Food Shortness of Breath and Swelling     mushrooms    Advil [Ibuprofen] Other (comments)     \"headache\"    Mushroom Combination No.1 Shortness of Breath and Swelling    Shellfish Derived Other (comments)     Dizziness headache. Past Medical History:   Diagnosis Date    Asthma     2013 had wheezing and used inhaler, no problems since    Hypothyroid     Uterine cancer [facility])      Past Surgical History:   Procedure Laterality Date    HX GYN  2012    \"surgery for hematoma near uterus\" laparoscopic    HX HYSTERECTOMY  11/2015    TLH    HX SALPINGO-OOPHORECTOMY  11/2015    BILATERAL     Social History     Social History    Marital status:      Spouse name: N/A    Number of children: N/A    Years of education: N/A     Occupational History    Not on file. Social History Main Topics    Smoking status: Never Smoker    Smokeless tobacco: Never Used    Alcohol use No    Drug use: No    Sexual activity: Not on file     Other Topics Concern    Not on file     Social History Narrative       OBJECTIVE:    Vitals:   Vitals:    03/27/18 1551   BP: 111/63   Pulse: (!) 55   Weight: 170 lb (77.1 kg)   Height: 5' 3\" (1.6 m)     Physical Examination:     GeneraL:  Nodes:   HEENT:  alert, cooperative, no distress  Negative throughout  Normocephalic, no JVD or thyroid nodularity   Lungs: lungs clear to auscultation and percussion   Cardiac: Regular rate and rhythm   Abdomen: soft, bowel sounds active, non-tender  No CVA tenderness. No fluid wave or organomegaly   Incision: healed well, no hernia   Pelvic: Vulva: No lesion, BUS negative  Vagina: Smooth, no suspicious induration or nodularity. Cytology taken  Bimanual: No suspicious masses, induration or tenderness. Rectal: not done   Extremity:   extremities normal, atraumatic, no cyanosis or edema     IMPRESSION:    Rochelle Worrell is doing well   She has a working diagnosis of     ICD-10-CM ICD-9-CM    1. [facility] C54.1 182.0         Medical problems:     Patient Active Problem List   Diagnosis Code    Abnormal uterine bleeding N93.9    Endometrial thickening on ultra sound R93.8    Endometrial cancer (HCC) C54.1       IMPRESSION/PLAN:    S/P IV Carbo/Taxol x 6  S/P RT    New onset esophageal dysphagia    EOD CT--chest/abdomen/pelvis    Return following CT      Barbara Yousif MD  3/27/2018/2:16 PM    Defined Sensitive Document    CC:   Raquel Rick MD   No ref.  provider found

## 2018-03-27 NOTE — PATIENT INSTRUCTIONS
[employer] Activation    Thank you for requesting access to [employer]. Please follow the instructions below to securely access and download your online medical record. [employer] allows you to send messages to your doctor, view your test results, renew your prescriptions, schedule appointments, and more. How Do I Sign Up? 1. In your internet browser, go to [email or web address]. 2. Click on the First Time User? Click Here link in the Sign In box. You will see the New Member Sign Up page. 3. Enter your [employer] Access Code exactly as it appears below. You will not need to use this code after youve completed the sign-up process. If you do not sign up before the expiration date, you must request a new code. Object[employer] Access Code: [email or web address]  Expires: 2018  3:45 PM (This is the date your [employer] access code will )    4. Enter the last four digits of your Social Security Number (xxxx) and Date of Birth (mm/dd/yyyy) as indicated and click Submit. You will be taken to the next sign-up page. 5. Create a [employer] ID. This will be your [employer] login ID and cannot be changed, so think of one that is secure and easy to remember. 6. Create a [employer] password. You can change your password at any time. 7. Enter your Password Reset Question and Answer. This can be used at a later time if you forget your password. 8. Enter your e-mail address. You will receive e-mail notification when new information is available in [address] 9. Click Sign Up. You can now view and download portions of your medical record. 10. Click the Download Summary menu link to download a portable copy of your medical information. Additional Information    If you have questions, please visit the Frequently Asked Questions section of the [employer] website at [email or web address] Remember, [employer] is NOT to be used for urgent needs. For medical emergencies, dial 911.

## 2018-03-28 ENCOUNTER — HOSPITAL ENCOUNTER (OUTPATIENT)
Dept: LAB | Age: 48
Discharge: HOME | End: 2018-03-28
Payer: COMMERCIAL

## 2018-03-28 PROCEDURE — 88142 CYTOPATH C/V THIN LAYER: CPT | Performed by: OBSTETRICS & GYNECOLOGY

## 2018-04-04 ENCOUNTER — HOSPITAL ENCOUNTER (OUTPATIENT)
Dept: CT IMAGING | Age: 48
Discharge: HOME | End: 2018-04-04
Attending: OBSTETRICS & GYNECOLOGY
Payer: SELF-PAY

## 2018-04-04 DIAGNOSIS — R13.19 ESOPHAGEAL DYSPHAGIA: ICD-10-CM

## 2018-04-04 DIAGNOSIS — C54.1 ENDOMETRIAL CANCER (HCC): ICD-10-CM

## 2018-04-04 DIAGNOSIS — R06.00 DYSPNEA, UNSPECIFIED TYPE: ICD-10-CM

## 2018-04-04 PROCEDURE — 74177 CT ABD & PELVIS W/CONTRAST: CPT

## 2018-04-04 PROCEDURE — 74011000258 HC RX REV CODE- 258: Performed by: OBSTETRICS & GYNECOLOGY

## 2018-04-04 PROCEDURE — 71260 CT THORAX DX C+: CPT

## 2018-04-04 PROCEDURE — 74011636320 HC RX REV CODE- 636/320: Performed by: OBSTETRICS & GYNECOLOGY

## 2018-04-04 RX ORDER — SODIUM CHLORIDE 0.9 % (FLUSH) 0.9 %
10 SYRINGE (ML) INJECTION
Status: COMPLETED | OUTPATIENT
Start: 2018-04-04 | End: 2018-04-04

## 2018-04-04 RX ADMIN — Medication 10 ML: at 16:14

## 2018-04-04 RX ADMIN — IOPAMIDOL 100 ML: 755 INJECTION, SOLUTION INTRAVENOUS at 16:14

## 2018-04-04 RX ADMIN — SODIUM CHLORIDE 100 ML: 900 INJECTION, SOLUTION INTRAVENOUS at 16:14

## 2018-04-17 ENCOUNTER — OFFICE VISIT (OUTPATIENT)
Dept: GYNECOLOGY | Age: 48
End: 2018-04-17

## 2018-04-17 VITALS
HEART RATE: 58 BPM | BODY MASS INDEX: 30.12 KG/M2 | SYSTOLIC BLOOD PRESSURE: 106 MMHG | WEIGHT: 170 LBS | DIASTOLIC BLOOD PRESSURE: 65 MMHG | HEIGHT: 63 IN

## 2018-04-17 DIAGNOSIS — C54.1 ENDOMETRIAL CANCER (HCC): Primary | ICD-10-CM

## 2018-04-17 NOTE — PROGRESS NOTES
27 [facility] Mathias Moritz [address]  [phone]  Dr. Brianne Bell, III    Dr. Kendrick Najjar, [place] , [facility]   Office Note  Patient ID:  Name: Marcello Tirado  MRM: [id number]  : 1970/48 y.o. Date: 2018    Diagnosis:     ICD-10-CM ICD-9-CM    1. Endometrial cancer ([facility] [address] C54.1 182.0        Problem List:   Patient Active Problem List   Diagnosis Code    Abnormal uterine bleeding N93.9    Endometrial thickening on ultra sound R93.8    Endometrial cancer (HCC) C54.1     HISTORY:  55 y.o.  female with diagnosis of endometrial cancer. Original surgery was a TLH/BSO with peritoneal/pelvic biopsies on 11/9/15. She was optimally debulked.  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 Metastatic disease present at pelvic peritoneal biopsies. She is recommended adjuvant carboplatin/paclitaxel therapy (initiated 12/28/15) under Brianne Bell MD.  S/P IV Carbo/Taxol x 6  S/P Pelvic RT    Family Hx: Distant relative with splenic cancer.    No GI/, uterine, breast, ovarian, pancreatic cancer. Brother and sister, nieces/nephews. SUBJECTIVE:    Marcello Tirado is a 50 y.o. female who presents for followup  care following staging laparotomy, 2015,     The patient's pathology revealed   FINAL PATHOLOGIC DIAGNOSIS  1. Soft tissue, cul-de-sac nodule, excision:  Metastatic endometrial carcinoma  2.  Uterus, bilateral ovaries and fallopian tubes, hysterectomy and salpingo-oophorectomy:  Poorly differentiated endometrial carcinoma  See comment  Synoptic Report:  Specimen: Uterus, bilateral ovaries and fallopian tubes, soft tissue (cul-de-sac, peritoneum)  Procedure: Hysterectomy, bilateral salpingo-oophorectomy, peritoneal/serosal biopsies  Lymph node sampling: Not performed  Specimen integrity: Intact hysterectomy specimen  Tumor size: 6.0 cm  Histologic type: Endometrioid adenocarcinoma with extensive squamous differentiation  Histologic grade: FIGO grade 3  Myometrial invasion:  Depth of invasion: 12 mm  Myometrial thickness: 21 mm  Involvement of cervix: Not involved  Extent of involvement of other organs:  Right ovary: Involved  Left ovary: Involved  Right fallopian tube: Not involved  Left fallopian tube: Not involved  Cul-de-sac: Involved  Rectal serosa: Involved  Right and left pelvic sidewalls: Involved  Lymphovascular invasion: Not identified  Additional pathologic findings: Focal endometriosis, leiomyomas  Pathologic staging (pTNM):  Primary tumor (pT): pT3a  Regional lymph nodes (pN): pNX  Distant metastasis (M): Not applicable  3. Rectal serosa, nodule, biopsy:  Metastatic endometrial carcinoma  4. Soft tissue, right pelvic sidewall, biopsy:  Metastatic endometrial carcinoma  5. Soft tissue, left pelvic sidewall, biopsy:  Single focus of metastatic endometrial carcinoma  Comment  Stains performed on the original biopsy specimen ([id number]) show that the tumor is positive for estrogen receptor,  vimentin and CK7. In addition, it also shows strong diffuse positivity for p16. This finding raised the possibility of a  poorly differentiated endocervical adenocarcinoma; however, findings in the hysterectomy specimen show benign,  uninvolved cervix with a large 6.0 cm endometrial mass showing deep invasion into the myometrium. The majority of  the tumor shows extensive squamous differentiation with marked cellular atypia and increased mitotic activity. Focally there are areas of more conventional endometrioid endometrial carcinoma. Extensive bilateral involvement of  both ovaries is present as well as metastases in the cul-de-sac, rectal serosa and right and left pelvic sidewalls. S/P Cycle #6/6 completed, tolerated well, peripheral neuropathy complaints? ?  S/P RT    EOD CT 4/2018:  FINDINGS:  CHEST:  Chest wall/thoracic inlet:  Within normal limits. Thyroid: Within normal limits. Mediastinum/ida: Within normal limits. Heart/vessels: Within normal limits. Lungs/Pleura: Within normal limits. .  ABDOMEN:  Liver: Within normal limits. Gallbladder/Biliary: Within normal limits. Spleen: Within normal limits. Pancreas: Within normal limits. Adrenals: Within normal limits. Kidneys: Within normal limits. Peritoneum/Mesenteries: Within normal limits. Extraperitoneum: Within normal limits. Gastrointestinal tract: Small bowel is normal in course and caliber. There is  diverticulosis of the colon, with no evidence of acute diverticulitis. Vascular: Within normal limits. Merrill Teresa PELVIS:  Extraperitoneum: Within normal limits. Ureters: Within normal limits. Bladder: Within normal limits. Reproductive System: Status post hysterectomy. Additionally, the ovaries are not  visualized and may also be surgically absent. There is no evidence of pelvic  lymphadenopathy or recurrent mass at the surgical clips in the lower pelvis. Furthermore, the appearance at the surgical site appears not significantly  changed in comparison to the prior study. .  MSK: Within normal limits. .  IMPRESSION  IMPRESSION:  1. No evidence of metastatic disease within the chest, abdomen, or pelvis. 2. Stable appearance of the postsurgical changes in the pelvis, status post  hysterectomy. No pelvic mass or lymphadenopathy identified. 3. No acute abnormality. 4/17/2018: The patient presents today to review EOD CT. Abdominal bloating improved. No gagging. Bowel movements are regular. The patient does complain of LUQ pain, episodic/ associated ipsilateral back pain. No fever/chills  Negative  or GI dysfunction or association.       Medications:     Current Outpatient Prescriptions on File Prior to Visit   Medication Sig Dispense Refill    levothyroxine (SYNTHROID) 50 mcg tablet TAKE 1 TABLET BY MOUTH EVERY DAY  1     No current facility-administered medications on file prior to visit. Allergies: Allergies   Allergen Reactions    Other Food Shortness of Breath and Swelling     mushrooms    Advil [Ibuprofen] Other (comments)     \"headache\"    Mushroom Combination No.1 Shortness of Breath and Swelling    Shellfish Derived Other (comments)     Dizziness headache. Past Medical History:   Diagnosis Date    Asthma     2013 had wheezing and used inhaler, no problems since    Hypothyroid     Uterine cancer [facility])      Past Surgical History:   Procedure Laterality Date    HX GYN  2012    \"surgery for hematoma near uterus\" laparoscopic    HX HYSTERECTOMY  11/2015    TLH    HX SALPINGO-OOPHORECTOMY  11/2015    BILATERAL     Social History     Social History    Marital status:      Spouse name: N/A    Number of children: N/A    Years of education: N/A     Occupational History    Not on file. Social History Main Topics    Smoking status: Never Smoker    Smokeless tobacco: Never Used    Alcohol use No    Drug use: No    Sexual activity: Not on file     Other Topics Concern    Not on file     Social History Narrative       OBJECTIVE:    Vitals:   Vitals:    04/17/18 1553   BP: 106/65   Pulse: (!) 58   Weight: 170 lb (77.1 kg)   Height: 5' 3\" (1.6 m)     Physical Examination:Prior Office examination     GeneraL:  Nodes: HEENT:  alert, cooperative, no distress  Negative throughout  Normocephalic, no JVD or thyroid nodularity   Lungs: lungs clear to auscultation and percussion   Cardiac: Regular rate and rhythm   Abdomen: soft, bowel sounds active, non-tender  No CVA tenderness. No fluid wave or organomegaly   Incision: healed well, no hernia   Pelvic: Vulva: No lesion, BUS negative  Vagina: Smooth, no suspicious induration or nodularity. Cytology taken  Bimanual: No suspicious masses, induration or tenderness.    Rectal: not done   Extremity:   extremities normal, atraumatic, no cyanosis or edema     IMPRESSION:    Lonie Nageotte is doing well   She has a working diagnosis of     ICD-10-CM ICD-9-CM    1. Endometrial cancer ([facility].) C54.1 182.0         Medical problems:     Patient Active Problem List   Diagnosis Code    Abnormal uterine bleeding N93.9    Endometrial thickening on ultra sound R93.8    Endometrial cancer ([facility] 75.) C54.1       IMPRESSION/PLAN:    S/P IV Carbo/Taxol x 6  S/P RT    BRONSON    CT reviewed and discussed with patient  Conservative follow up  Return 3 months or PRN      Lizette Rock MD  4/17/2018/2:16 PM    Defined Sensitive Document    CC:   Rita Judge MD   No ref.  provider found

## 2018-05-01 NOTE — PROGRESS NOTES
Patient:   Marcello Tirado  SSN: [id number]  : 1970    Date:    2018    Ms. Beltran Payment cytology/Pap smear has been interpreted as within normal limts. I would ask that subsequent Pap smears be performed at the interval discussed at the last office visit.     If there are any questions please do not hesitate to contact our offices [phone] Srini Martinez MD

## 2018-07-24 ENCOUNTER — HOSPITAL ENCOUNTER (OUTPATIENT)
Dept: LAB | Age: 48
Discharge: HOME | End: 2018-07-24
Payer: COMMERCIAL

## 2018-07-24 ENCOUNTER — OFFICE VISIT (OUTPATIENT)
Dept: GYNECOLOGY | Age: 48
End: 2018-07-24

## 2018-07-24 VITALS
BODY MASS INDEX: 30.12 KG/M2 | WEIGHT: 170 LBS | SYSTOLIC BLOOD PRESSURE: 106 MMHG | HEIGHT: 63 IN | HEART RATE: 59 BPM | DIASTOLIC BLOOD PRESSURE: 61 MMHG

## 2018-07-24 DIAGNOSIS — C54.1 ENDOMETRIAL CANCER (HCC): Primary | ICD-10-CM

## 2018-07-24 PROCEDURE — 88142 CYTOPATH C/V THIN LAYER: CPT | Performed by: OBSTETRICS & GYNECOLOGY

## 2018-07-24 NOTE — PROGRESS NOTES
27 [facility] Mathias Moritz [address], [address]  [phone]  Dr. Radha Mejias, III    Dr. Kaylen Castro, [facility], [facility]   Office Note  Patient ID:  Name: Rosa Cheema  MRM: [id number]  : 1970/48 y.o. Date: 2018    Diagnosis:     ICD-10-CM ICD-9-CM    1. Endometrial cancer ([facility] [address] C54.1 182.0        Problem List:   Patient Active Problem List   Diagnosis Code    Abnormal uterine bleeding N93.9    Endometrial thickening on ultra sound R93.8    Endometrial cancer (HCC) C54.1     HISTORY:  55 y.o.  female with diagnosis of endometrial cancer. Original surgery was a TLH/BSO with peritoneal/pelvic biopsies on 11/9/15. She was optimally debulked.  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 Metastatic disease present at pelvic peritoneal biopsies. She is recommended adjuvant carboplatin/paclitaxel therapy (initiated 12/28/15) under Radha Mejias MD.  S/P IV Carbo/Taxol x 6  S/P Pelvic RT    Family Hx: Distant relative with splenic cancer.    No GI/, uterine, breast, ovarian, pancreatic cancer. Brother and sister, nieces/nephews. SUBJECTIVE:    Rosa Cheema is a 50 y.o. female who presents for followup  care following staging laparotomy, 2015,     The patient's pathology revealed   FINAL PATHOLOGIC DIAGNOSIS  1. Soft tissue, cul-de-sac nodule, excision:  Metastatic endometrial carcinoma  2.  Uterus, bilateral ovaries and fallopian tubes, hysterectomy and salpingo-oophorectomy:  Poorly differentiated endometrial carcinoma  See comment  Synoptic Report:  Specimen: Uterus, bilateral ovaries and fallopian tubes, soft tissue (cul-de-sac, peritoneum)  Procedure: Hysterectomy, bilateral salpingo-oophorectomy, peritoneal/serosal biopsies  Lymph node sampling: Not performed  Specimen integrity: Intact hysterectomy specimen  Tumor size: 6.0 cm  Histologic type: Endometrioid adenocarcinoma with extensive squamous differentiation  Histologic grade: FIGO grade 3  Myometrial invasion:  Depth of invasion: 12 mm  Myometrial thickness: 21 mm  Involvement of cervix: Not involved  Extent of involvement of other organs:  Right ovary: Involved  Left ovary: Involved  Right fallopian tube: Not involved  Left fallopian tube: Not involved  Cul-de-sac: Involved  Rectal serosa: Involved  Right and left pelvic sidewalls: Involved  Lymphovascular invasion: Not identified  Additional pathologic findings: Focal endometriosis, leiomyomas  Pathologic staging (pTNM):  Primary tumor (pT): pT3a  Regional lymph nodes (pN): pNX  Distant metastasis (M): Not applicable  3. Rectal serosa, nodule, biopsy:  Metastatic endometrial carcinoma  4. Soft tissue, right pelvic sidewall, biopsy:  Metastatic endometrial carcinoma  5. Soft tissue, left pelvic sidewall, biopsy:  Single focus of metastatic endometrial carcinoma  Comment  Stains performed on the original biopsy specimen ([id number]) show that the tumor is positive for estrogen receptor,  vimentin and CK7. In addition, it also shows strong diffuse positivity for p16. This finding raised the possibility of a  poorly differentiated endocervical adenocarcinoma; however, findings in the hysterectomy specimen show benign,  uninvolved cervix with a large 6.0 cm endometrial mass showing deep invasion into the myometrium. The majority of  the tumor shows extensive squamous differentiation with marked cellular atypia and increased mitotic activity. Focally there are areas of more conventional endometrioid endometrial carcinoma. Extensive bilateral involvement of  both ovaries is present as well as metastases in the cul-de-sac, rectal serosa and right and left pelvic sidewalls. S/P Cycle #6/6 completed, tolerated well, peripheral neuropathy complaints? ?  S/P RT    EOD CT 4/2018:  FINDINGS:  CHEST:  Chest wall/thoracic inlet:  Within normal limits. Thyroid: Within normal limits. Mediastinum/ida: Within normal limits. Heart/vessels: Within normal limits. Lungs/Pleura: Within normal limits. .  ABDOMEN:  Liver: Within normal limits. Gallbladder/Biliary: Within normal limits. Spleen: Within normal limits. Pancreas: Within normal limits. Adrenals: Within normal limits. Kidneys: Within normal limits. Peritoneum/Mesenteries: Within normal limits. Extraperitoneum: Within normal limits. Gastrointestinal tract: Small bowel is normal in course and caliber. There is  diverticulosis of the colon, with no evidence of acute diverticulitis. Vascular: Within normal limits. Jamas Dubonnet PELVIS:  Extraperitoneum: Within normal limits. Ureters: Within normal limits. Bladder: Within normal limits. Reproductive System: Status post hysterectomy. Additionally, the ovaries are not  visualized and may also be surgically absent. There is no evidence of pelvic  lymphadenopathy or recurrent mass at the surgical clips in the lower pelvis. Furthermore, the appearance at the surgical site appears not significantly  changed in comparison to the prior study. .  MSK: Within normal limits. .  IMPRESSION  IMPRESSION:  1. No evidence of metastatic disease within the chest, abdomen, or pelvis. 2. Stable appearance of the postsurgical changes in the pelvis, status post  hysterectomy. No pelvic mass or lymphadenopathy identified. 3. No acute abnormality. 7/24/2018: The patient presents today in follow up at a two month interval.     Bowel movements are regular. No fever/chills  Negative  or GI dysfunction or association. Medications:     Current Outpatient Prescriptions on File Prior to Visit   Medication Sig Dispense Refill    levothyroxine (SYNTHROID) 50 mcg tablet TAKE 1 TABLET BY MOUTH EVERY DAY  1     No current facility-administered medications on file prior to visit. Allergies:      Allergies   Allergen Reactions    Other Food Shortness of Breath and Swelling     mushrooms    Advil [Ibuprofen] Other (comments)     \"headache\"    Mushroom Combination No.1 Shortness of Breath and Swelling    Shellfish Derived Other (comments)     Dizziness headache. Past Medical History:   Diagnosis Date    Asthma     2013 had wheezing and used inhaler, no problems since    Hypothyroid     Uterine cancer [facility])      Past Surgical History:   Procedure Laterality Date    HX GYN  2012    \"surgery for hematoma near uterus\" laparoscopic    HX HYSTERECTOMY  11/2015    TLH    HX SALPINGO-OOPHORECTOMY  11/2015    BILATERAL     Social History     Social History    Marital status:      Spouse name: N/A    Number of children: N/A    Years of education: N/A     Occupational History    Not on file. Social History Main Topics    Smoking status: Never Smoker    Smokeless tobacco: Never Used    Alcohol use No    Drug use: No    Sexual activity: Not on file     Other Topics Concern    Not on file     Social History Narrative       OBJECTIVE:    Vitals:   Vitals:    07/24/18 1527   BP: 106/61   Pulse: (!) 59   Weight: 170 lb (77.1 kg)   Height: 5' 3\" (1.6 m)     Physical Examination:Prior Office examination     GeneraL:  Nodes: HEENT:  alert, cooperative, no distress  Negative throughout  Normocephalic, no JVD or thyroid nodularity   Lungs: lungs clear to auscultation and percussion   Cardiac: Regular rate and rhythm   Abdomen: soft, bowel sounds active, non-tender  No CVA tenderness. No fluid wave or organomegaly   Incision: healed well, no hernia   Pelvic: Vulva: No lesion, BUS negative  Vagina: Smooth, no suspicious induration or nodularity. Cytology taken  Bimanual: No suspicious masses, induration or tenderness. PSW clear   Rectal: not done   Extremity:   extremities normal, atraumatic, no cyanosis or edema     IMPRESSION:    Mitch Ponce is doing well   She has a working diagnosis of     ICD-10-CM ICD-9-CM    1.  Endometrial cancer (HCC) C54.1 182.0 IMPRESSION/PLAN:     Patient Active Problem List   Diagnosis Code    Abnormal uterine bleeding N93.9    Endometrial thickening on ultra sound R93.8    Endometrial cancer (HCC) C54.1     S/P IV Carbo/Taxol x 6  S/P RT    BRONSON    Cytologyt taken  Conservative follow up  Return 3 months or PRN      Ling Hung MD  7/24/2018/2:16 PM    Defined Sensitive Document    CC:   Tim Bran MD   No ref.  provider found

## 2018-07-24 NOTE — PROGRESS NOTES
3 month check up, pt reports no abnormal spotting or bleeding, pt complains of pain in her left ear that started today 7/10 on pain scale, pain comes and goes, pt was instructed to see primary care if pain persists    1. Have you been to the ER, urgent care clinic since your last visit? Hospitalized since your last visit?  no    2. Have you seen or consulted any other health care providers outside of the [address] [place] [address] since your last visit? Include any pap smears or colon screening.      no

## 2018-10-23 ENCOUNTER — OFFICE VISIT (OUTPATIENT)
Dept: GYNECOLOGY | Age: 48
End: 2018-10-23

## 2018-10-23 ENCOUNTER — HOSPITAL ENCOUNTER (OUTPATIENT)
Dept: LAB | Age: 48
Discharge: HOME | End: 2018-10-23
Payer: COMMERCIAL

## 2018-10-23 VITALS
BODY MASS INDEX: 30.02 KG/M2 | HEIGHT: 63 IN | WEIGHT: 169.4 LBS | DIASTOLIC BLOOD PRESSURE: 73 MMHG | HEART RATE: 58 BPM | SYSTOLIC BLOOD PRESSURE: 111 MMHG

## 2018-10-23 DIAGNOSIS — C54.1 ENDOMETRIAL CANCER (HCC): Primary | ICD-10-CM

## 2018-10-23 PROCEDURE — 88175 CYTOPATH C/V AUTO FLUID REDO: CPT | Performed by: OBSTETRICS & GYNECOLOGY

## 2018-10-23 NOTE — PATIENT INSTRUCTIONS
[employer] Activation    Thank you for requesting access to [employer]. Please follow the instructions below to securely access and download your online medical record. [employer] allows you to send messages to your doctor, view your test results, renew your prescriptions, schedule appointments, and more. How Do I Sign Up? 1. In your internet browser, go to [email or web address]. 2. Click on the First Time User? Click Here link in the Sign In box. You will see the New Member Sign Up page. 3. Enter your [employer] Access Code exactly as it appears below. You will not need to use this code after youve completed the sign-up process. If you do not sign up before the expiration date, you must request a new code. [employer] Access Code: [email or web address]  Expires: 2019  3:24 PM (This is the date your [employer] access code will )    4. Enter the last four digits of your Social Security Number (xxxx) and Date of Birth (mm/dd/yyyy) as indicated and click Submit. You will be taken to the next sign-up page. 5. Create a [employer] ID. This will be your [employer] login ID and cannot be changed, so think of one that is secure and easy to remember. 6. Create a [employer] password. You can change your password at any time. 7. Enter your Password Reset Question and Answer. This can be used at a later time if you forget your password. 8. Enter your e-mail address. You will receive e-mail notification when new information is available in [address]. 9. Click Sign Up. You can now view and download portions of your medical record. 10. Click the Download Summary menu link to download a portable copy of your medical information. Additional Information    If you have questions, please visit the Frequently Asked Questions section of the [employer] website at [email or web address] Remember, [employer] is NOT to be used for urgent needs. For medical emergencies, dial 911.

## 2018-10-23 NOTE — PROGRESS NOTES
[facility] [address], [address] [place]  P [phone]  F [phone]    Office Note  Patient ID:  Name:  Rosi Agustin  MRN:  [id number]  :  1970/48 y.o. Date:  10/23/2018       HISTORY OF PRESENT ILLNESS:  Rosi Agustin is a 50 y.o.  postmenopausal female with a diagnosis of FIGO stage III, grade 3, endometrial carcinoma, diagnosed in 2015. At that time she underwent TLH, BSO, and staging. She is a former patient of Dr. Leandro De Guzman. She was recommended adjuvant Taxol/Carboplatin chemotherapy followed by pelvic radiation therapy. She presents today for surveillance. She is doing well and is without complaints. ROS:   and GI review:  Negative  Cardiopulmonary review:  Negative   Musculoskeletal:  Negative    A comprehensive review of systems was negative except for that written in the History of Present Illness. , 10 point ROS        Problem List:  Patient Active Problem List    Diagnosis Date Noted    Endometrial cancer ([facility] [address] 2015    Abnormal uterine bleeding 2015    Endometrial thickening on ultra sound 2015     PMH:  Past Medical History:   Diagnosis Date    Asthma      had wheezing and used inhaler, no problems since    Hypothyroid     Uterine cancer ([facility] [address]       PSH:  Past Surgical History:   Procedure Laterality Date    HX GYN      \"surgery for hematoma near uterus\" laparoscopic    HX HYSTERECTOMY  2015    TLH    HX SALPINGO-OOPHORECTOMY  2015    BILATERAL      Social History:  Social History     Tobacco Use    Smoking status: Never Smoker    Smokeless tobacco: Never Used   Substance Use Topics    Alcohol use: No     Alcohol/week: 0.0 oz      Family History:  Family History   Problem Relation Age of Onset    Anesth Problems Neg Hx       Medications: (reviewed)  Current Outpatient Medications   Medication Sig    levothyroxine (SYNTHROID) 50 mcg tablet TAKE 1 TABLET BY MOUTH EVERY DAY    cyanocobalamin, vitamin B-12, (VITAMIN B-12 PO) Take  by mouth. No current facility-administered medications for this visit. Allergies: (reviewed)  Allergies   Allergen Reactions    Other Food Shortness of Breath and Swelling     mushrooms    Advil [Ibuprofen] Other (comments)     \"headache\"    Mushroom Combination No.1 Shortness of Breath and Swelling    Shellfish Derived Other (comments)     Dizziness headache. OBJECTIVE:soft, non-tender, without masses or organomegaly  Physical Exam:  VITAL SIGNS: Vitals:    10/23/18 1534   BP: 111/73   Pulse: (!) 58   Weight: 169 lb 6.4 oz (76.8 kg)   Height: 5' 2.99\" (1.6 m)     Body mass index is 30.02 kg/m². GENERAL MEGGAN: Conversant, alert, oriented. No acute distress. HEENT: HEENT. No thyroid enlargement. No JVD. Neck: Supple without restrictions. RESPIRATORY: Clear to auscultation and percussion to the bases. No CVAT. CARDIOVASC: RRR without murmur/rub. GASTROINT: soft, non-tender, without masses or organomegaly   MUSCULOSKEL: no joint tenderness, deformity or swelling   EXTREMITIES: extremities normal, atraumatic, no cyanosis or edema   PELVIC: External genitalia: normal general appearance  Urinary system: urethral meatus normal  Vaginal: normal without tenderness, induration or masses  Cervix: absent  Adnexa: removed surgically  Uterus: absent   RECTAL: Deferred   [place] SURVEY: No suspicious lymphadenopathy or edema noted. NEURO: Grossly intact. No acute deficit.        Lab Data:    Lab Results   Component Value Date/Time    WBC 3.6 11/08/2016 08:28 AM    HGB 13.5 11/08/2016 08:28 AM    HCT 41.0 11/08/2016 08:28 AM    PLATELET 556 45/92/6816 08:28 AM    MCV 86.9 11/08/2016 08:28 AM     Lab Results   Component Value Date/Time    Sodium 142 11/08/2016 08:28 AM    Potassium 4.9 11/08/2016 08:28 AM    Chloride 105 11/08/2016 08:28 AM    CO2 32 11/08/2016 08:28 AM    Anion gap 5 11/08/2016 08:28 AM    Glucose 92 11/08/2016 08:28 AM    BUN 11 11/08/2016 08:28 AM    Creatinine 0.76 11/08/2016 08:28 AM    BUN/Creatinine ratio 14 11/08/2016 08:28 AM    GFR est AA >60 11/08/2016 08:28 AM    GFR est non-AA >60 11/08/2016 08:28 AM    Calcium 9.6 11/08/2016 08:28 AM       CT of chest/abdomen/pelvis (4/4/18)  CHEST:   Chest wall/thoracic inlet: Within normal limits. Thyroid: Within normal limits. Mediastinum/ida: Within normal limits. Heart/vessels: Within normal limits. Lungs/Pleura: Within normal limits. ABDOMEN:   Liver: Within normal limits. Gallbladder/Biliary: Within normal limits. Spleen: Within normal limits. Pancreas: Within normal limits. Adrenals: Within normal limits. Kidneys: Within normal limits. Peritoneum/Mesenteries: Within normal limits. Extraperitoneum: Within normal limits. Gastrointestinal tract: Small bowel is normal in course and caliber. There is   diverticulosis of the colon, with no evidence of acute diverticulitis. Vascular: Within normal limits. PELVIS:   Extraperitoneum: Within normal limits. Ureters: Within normal limits. Bladder: Within normal limits. Reproductive System: Status post hysterectomy. Additionally, the ovaries are not   visualized and may also be surgically absent. There is no evidence of pelvic   lymphadenopathy or recurrent mass at the surgical clips in the lower pelvis. Furthermore, the appearance at the surgical site appears not significantly   changed in comparison to the prior study. MSK: Within normal limits. IMPRESSION:   1. No evidence of metastatic disease within the chest, abdomen, or pelvis. 2. Stable appearance of the postsurgical changes in the pelvis, status post   hysterectomy. No pelvic mass or lymphadenopathy identified. 3. No acute abnormality. IMPRESSION/PLAN:  Danielle Restrepo is a 50 y.o. female with a diagnosis of stage III, grade 3, endometrial cancer.   She has no evidence of disease on today's exam.  We will see her back in 3-6 months for continued surveillance of disease.           Signed By: Megha Elizalde MD     10/23/2018/3:17 PM

## 2018-10-23 NOTE — PROGRESS NOTES
Three month check up. Patient states no abnormal spotting or bleeding. Patient states no questions or concerns for today's visit. 1. Have you been to the ER, urgent care clinic since your last visit? Hospitalized since your last visit? No    2. Have you seen or consulted any other health care providers outside of the [address] [place] [address] since your last visit? Include any pap smears or colon screening.  No

## 2018-10-24 LAB
ALBUMIN SERPL-MCNC: 4.4 G/DL (ref 3.5–5.5)
ALBUMIN/GLOB SERPL: 1.4 {RATIO} (ref 1.2–2.2)
ALP SERPL-CCNC: 154 IU/L (ref 39–117)
ALT SERPL-CCNC: 20 IU/L (ref 0–32)
AST SERPL-CCNC: 20 IU/L (ref 0–40)
BASOPHILS # BLD AUTO: 0 X10E3/UL (ref 0–0.2)
BASOPHILS NFR BLD AUTO: 1 %
BILIRUB SERPL-MCNC: 0.2 MG/DL (ref 0–1.2)
BUN SERPL-MCNC: 12 MG/DL (ref 6–24)
BUN/CREAT SERPL: 14 (ref 9–23)
CALCIUM SERPL-MCNC: 9.9 MG/DL (ref 8.7–10.2)
CANCER AG125 SERPL-ACNC: 10.3 U/ML (ref 0–38.1)
CHLORIDE SERPL-SCNC: 101 MMOL/L (ref 96–106)
CO2 SERPL-SCNC: 26 MMOL/L (ref 20–29)
CREAT SERPL-MCNC: 0.83 MG/DL (ref 0.57–1)
EOSINOPHIL # BLD AUTO: 0.1 X10E3/UL (ref 0–0.4)
EOSINOPHIL NFR BLD AUTO: 2 %
ERYTHROCYTE [DISTWIDTH] IN BLOOD BY AUTOMATED COUNT: 14.8 % (ref 12.3–15.4)
GLOBULIN SER CALC-MCNC: 3.1 G/DL (ref 1.5–4.5)
GLUCOSE SERPL-MCNC: 97 MG/DL (ref 65–99)
HCT VFR BLD AUTO: 43.9 % (ref 34–46.6)
HGB BLD-MCNC: 14.3 G/DL (ref 11.1–15.9)
IMM GRANULOCYTES # BLD: 0 X10E3/UL (ref 0–0.1)
IMM GRANULOCYTES NFR BLD: 0 %
LYMPHOCYTES # BLD AUTO: 2 X10E3/UL (ref 0.7–3.1)
LYMPHOCYTES NFR BLD AUTO: 30 %
MCH RBC QN AUTO: 28.4 PG (ref 26.6–33)
MCHC RBC AUTO-ENTMCNC: 32.6 G/DL (ref 31.5–35.7)
MCV RBC AUTO: 87 FL (ref 79–97)
MONOCYTES # BLD AUTO: 0.3 X10E3/UL (ref 0.1–0.9)
MONOCYTES NFR BLD AUTO: 4 %
NEUTROPHILS # BLD AUTO: 4.2 X10E3/UL (ref 1.4–7)
NEUTROPHILS NFR BLD AUTO: 63 %
PLATELET # BLD AUTO: 313 X10E3/UL (ref 150–379)
POTASSIUM SERPL-SCNC: 4.1 MMOL/L (ref 3.5–5.2)
PROT SERPL-MCNC: 7.5 G/DL (ref 6–8.5)
RBC # BLD AUTO: 5.04 X10E6/UL (ref 3.77–5.28)
SODIUM SERPL-SCNC: 143 MMOL/L (ref 134–144)
WBC # BLD AUTO: 6.6 X10E3/UL (ref 3.4–10.8)

## 2019-01-01 NOTE — PROGRESS NOTES
Patient: 
 Brent Rausch SSN: [id number] : 1970 Date:  
 2018 Ms. Sandra Deluna cytology/Pap smear has been interpreted as within normal limts. I would ask that subsequent Pap smears be performed at the interval discussed at the last office visit. If there are any questions please do not hesitate to contact our offices [phone] [address] [place] [address] Rita Villarreal MD 
 No/Not applicable

## 2019-02-07 ENCOUNTER — OFFICE VISIT (OUTPATIENT)
Dept: GYNECOLOGY | Age: 49
End: 2019-02-07

## 2019-02-07 VITALS
DIASTOLIC BLOOD PRESSURE: 71 MMHG | HEART RATE: 65 BPM | SYSTOLIC BLOOD PRESSURE: 109 MMHG | WEIGHT: 168 LBS | BODY MASS INDEX: 29.77 KG/M2 | HEIGHT: 63 IN

## 2019-02-07 DIAGNOSIS — C54.1 ENDOMETRIAL CANCER (HCC): Primary | ICD-10-CM

## 2019-02-07 NOTE — PROGRESS NOTES
[address] [place] [address] [address], [address] [place]  P [phone]  F [phone]    Office Note  Patient ID:  Name:  Pooja Chun  MRN:  [id number]  :  1970/49 y.o. Date:  2019       HISTORY OF PRESENT ILLNESS:  Pooja Chun is a 52 y.o.  postmenopausal female with a diagnosis of FIGO stage III, grade 3, endometrial carcinoma, diagnosed in 2015. At that time she underwent TLH, BSO, and staging. She is a former patient of Dr. Annette Sims. She was recommended adjuvant Taxol/Carboplatin chemotherapy followed by pelvic radiation therapy. She presents today for surveillance. She is doing well and is without complaints. She denies any vaginal bleeding or discharge, pelvic or abdominal pain, or any changes in her bowel or bladder habits. She does report some dyspareunia. ROS:   and GI review:  Negative  Cardiopulmonary review:  Negative   Musculoskeletal:  Negative    A comprehensive review of systems was negative except for that written in the History of Present Illness. , 10 point ROS        Problem List:  Patient Active Problem List    Diagnosis Date Noted    Endometrial cancer ([facility] [address] 2015    Abnormal uterine bleeding 2015    Endometrial thickening on ultra sound 2015     PMH:  Past Medical History:   Diagnosis Date    Asthma      had wheezing and used inhaler, no problems since    Hypothyroid     Uterine cancer ([facility] [address]       PSH:  Past Surgical History:   Procedure Laterality Date    HX GYN      \"surgery for hematoma near uterus\" laparoscopic    HX HYSTERECTOMY  2015    TLH    HX SALPINGO-OOPHORECTOMY  2015    BILATERAL      Social History:  Social History     Tobacco Use    Smoking status: Never Smoker    Smokeless tobacco: Never Used   Substance Use Topics    Alcohol use: No     Alcohol/week: 0.0 oz      Family History:  Family History   Problem Relation Age of Onset    Anesth Problems Neg Hx       Medications: (reviewed)  Current Outpatient Medications   Medication Sig    levothyroxine (SYNTHROID) 50 mcg tablet TAKE 1 TABLET BY MOUTH EVERY DAY    cyanocobalamin, vitamin B-12, (VITAMIN B-12 PO) Take  by mouth. No current facility-administered medications for this visit. Allergies: (reviewed)  Allergies   Allergen Reactions    Other Food Shortness of Breath and Swelling     mushrooms    Advil [Ibuprofen] Other (comments)     \"headache\"    Mushroom Combination No.1 Shortness of Breath and Swelling    Shellfish Derived Other (comments)     Dizziness headache. OBJECTIVE:soft, non-tender, without masses or organomegaly  Physical Exam:  VITAL SIGNS: Vitals:    02/07/19 1530   BP: 109/71   Pulse: 65   Weight: 168 lb (76.2 kg)   Height: 5' 2.99\" (1.6 m)     Body mass index is 29.77 kg/m². GENERAL MEGGAN: Conversant, alert, oriented. No acute distress. HEENT: HEENT. No thyroid enlargement. No JVD. Neck: Supple without restrictions. RESPIRATORY: Clear to auscultation and percussion to the bases. No CVAT. CARDIOVASC: RRR without murmur/rub. GASTROINT: soft, non-tender, without masses or organomegaly   MUSCULOSKEL: no joint tenderness, deformity or swelling   EXTREMITIES: extremities normal, atraumatic, no cyanosis or edema   PELVIC: External genitalia: normal general appearance  Urinary system: urethral meatus normal  Vaginal: normal without tenderness, induration or masses  Cervix: absent  Adnexa: removed surgically  Uterus: absent   RECTAL: Deferred   [place] SURVEY: No suspicious lymphadenopathy or edema noted. NEURO: Grossly intact. No acute deficit.        Lab Data:    Lab Results   Component Value Date/Time    WBC 6.6 10/23/2018 04:22 PM    HGB 14.3 10/23/2018 04:22 PM    HCT 43.9 10/23/2018 04:22 PM    PLATELET 465 41/43/1781 04:22 PM    MCV 87 10/23/2018 04:22 PM     Lab Results   Component Value Date/Time    Sodium 143 10/23/2018 04:22 PM    Potassium 4.1 10/23/2018 04:22 PM    Chloride 101 10/23/2018 04:22 PM    CO2 26 10/23/2018 04:22 PM    Anion gap 5 11/08/2016 08:28 AM    Glucose 97 10/23/2018 04:22 PM    BUN 12 10/23/2018 04:22 PM    Creatinine 0.83 10/23/2018 04:22 PM    BUN/Creatinine ratio 14 10/23/2018 04:22 PM    GFR est AA 96 10/23/2018 04:22 PM    GFR est non-AA 84 10/23/2018 04:22 PM    Calcium 9.9 10/23/2018 04:22 PM       CT of chest/abdomen/pelvis (4/4/18)  CHEST:   Chest wall/thoracic inlet: Within normal limits. Thyroid: Within normal limits. Mediastinum/ida: Within normal limits. Heart/vessels: Within normal limits. Lungs/Pleura: Within normal limits. ABDOMEN:   Liver: Within normal limits. Gallbladder/Biliary: Within normal limits. Spleen: Within normal limits. Pancreas: Within normal limits. Adrenals: Within normal limits. Kidneys: Within normal limits. Peritoneum/Mesenteries: Within normal limits. Extraperitoneum: Within normal limits. Gastrointestinal tract: Small bowel is normal in course and caliber. There is   diverticulosis of the colon, with no evidence of acute diverticulitis. Vascular: Within normal limits. PELVIS:   Extraperitoneum: Within normal limits. Ureters: Within normal limits. Bladder: Within normal limits. Reproductive System: Status post hysterectomy. Additionally, the ovaries are not   visualized and may also be surgically absent. There is no evidence of pelvic   lymphadenopathy or recurrent mass at the surgical clips in the lower pelvis. Furthermore, the appearance at the surgical site appears not significantly   changed in comparison to the prior study. MSK: Within normal limits. IMPRESSION:   1. No evidence of metastatic disease within the chest, abdomen, or pelvis. 2. Stable appearance of the postsurgical changes in the pelvis, status post   hysterectomy. No pelvic mass or lymphadenopathy identified. 3. No acute abnormality.           IMPRESSION/PLAN:  Dominga Guthrie is a 52 y.o. female with a diagnosis of stage III, grade 3, endometrial cancer. She has no evidence of disease on today's exam.  We will see her back in 6 months for continued surveillance of disease. I recommended some lubrication during intercourse to see if that helps with the discomfort.           Signed By: Tyesha Park MD     2/7/2019/3:17 PM

## 2019-03-05 LAB — CANCER AG125 SERPL-ACNC: 9 U/ML (ref 0–38.1)

## 2019-08-26 NOTE — PROGRESS NOTES
Six month check up for history of endometrial cancer. Pt states no abnormal spotting, bleeding or pain. 1. Have you been to the ER, urgent care clinic since your last visit? Hospitalized since your last visit? Yes, left knee surgery on 8/21/19  2. Have you seen or consulted any other health care providers outside of the [address] [place] [address] since your last visit? Include any pap smears or colon screening. Mammogram.

## 2019-08-27 ENCOUNTER — OFFICE VISIT (OUTPATIENT)
Dept: GYNECOLOGY | Age: 49
End: 2019-08-27

## 2019-08-27 VITALS
HEART RATE: 61 BPM | SYSTOLIC BLOOD PRESSURE: 113 MMHG | DIASTOLIC BLOOD PRESSURE: 63 MMHG | WEIGHT: 164 LBS | HEIGHT: 63 IN | BODY MASS INDEX: 29.06 KG/M2

## 2019-08-27 DIAGNOSIS — C54.1 ENDOMETRIAL CANCER (HCC): Primary | ICD-10-CM

## 2019-08-27 NOTE — PROGRESS NOTES
[address] [place] [address] [address], [address] [place] [address] [phone]  F [phone]    Office Note  Patient ID:  Name:  Rell Horner  MRN:  [id number]  :  1970/49 y.o. Date:  2019       HISTORY OF PRESENT ILLNESS:  Rell Horner is a 52 y.o.  postmenopausal female with a diagnosis of FIGO stage III, grade 3, endometrial carcinoma, diagnosed in 2015. At that time she underwent TLH, BSO, and staging. She is a former patient of Dr. Tracee Mtz. She was recommended adjuvant Taxol/Carboplatin chemotherapy followed by pelvic radiation therapy. She presents today for surveillance. She is doing well and is without complaints. She denies any vaginal bleeding or discharge, pelvic or abdominal pain, or any changes in her bowel or bladder habits. She previously reported some dyspareunia, but that has improved with use of lubrication. ROS:   and GI review:  Negative  Cardiopulmonary review:  Negative   Musculoskeletal:  Negative    A comprehensive review of systems was negative except for that written in the History of Present Illness. , 10 point ROS        Problem List:  Patient Active Problem List    Diagnosis Date Noted    Endometrial cancer ([facility] [address] 2015    Abnormal uterine bleeding 2015    Endometrial thickening on ultra sound 2015     PMH:  Past Medical History:   Diagnosis Date    Asthma      had wheezing and used inhaler, no problems since    Hypothyroid     Uterine cancer ([address]       PSH:  Past Surgical History:   Procedure Laterality Date    HX GYN      \"surgery for hematoma near uterus\" laparoscopic    HX HYSTERECTOMY  2015    TLH    HX ORTHOPAEDIC Left 2019    knee    HX SALPINGO-OOPHORECTOMY  2015    BILATERAL      Social History:  Social History     Tobacco Use    Smoking status: Never Smoker    Smokeless tobacco: Never Used   Substance Use Topics    Alcohol use: No     Alcohol/week: 0.0 standard drinks      Family History:  Family History   Problem Relation Age of Onset    Anesth Problems Neg Hx       Medications: (reviewed)  Current Outpatient Medications   Medication Sig    levothyroxine (SYNTHROID) 50 mcg tablet TAKE 1 TABLET BY MOUTH EVERY DAY    cyanocobalamin, vitamin B-12, (VITAMIN B-12 PO) Take  by mouth. No current facility-administered medications for this visit. Allergies: (reviewed)  Allergies   Allergen Reactions    Other Food Shortness of Breath and Swelling     mushrooms    Advil [Ibuprofen] Other (comments)     \"headache\"    Mushroom Combination No.1 Shortness of Breath and Swelling    Shellfish Derived Other (comments)     Dizziness headache. OBJECTIVE:soft, non-tender, without masses or organomegaly  Physical Exam:  VITAL SIGNS: Vitals:    08/27/19 1500   BP: 113/63   Pulse: 61   Weight: 164 lb (74.4 kg)   Height: 5' 2.99\" (1.6 m)     Body mass index is 29.06 kg/m². GENERAL MEGGAN: Conversant, alert, oriented. No acute distress. HEENT: HEENT. No thyroid enlargement. No JVD. Neck: Supple without restrictions. RESPIRATORY: Clear to auscultation and percussion to the bases. No CVAT. CARDIOVASC: RRR without murmur/rub. GASTROINT: soft, non-tender, without masses or organomegaly   MUSCULOSKEL: no joint tenderness, deformity or swelling   EXTREMITIES: extremities normal, atraumatic, no cyanosis or edema   PELVIC: External genitalia: normal general appearance  Urinary system: urethral meatus normal  Vaginal: normal without tenderness, induration or masses  Cervix: absent  Adnexa: removed surgically  Uterus: absent   RECTAL: Deferred   [place] SURVEY: No suspicious lymphadenopathy or edema noted. NEURO: Grossly intact. No acute deficit.        Lab Data:    Lab Results   Component Value Date/Time    WBC 6.6 10/23/2018 04:22 PM    HGB 14.3 10/23/2018 04:22 PM    HCT 43.9 10/23/2018 04:22 PM    PLATELET 942 45/32/0258 04:22 PM    MCV 87 10/23/2018 04:22 PM     Lab Results   Component Value Date/Time    Sodium 143 10/23/2018 04:22 PM    Potassium 4.1 10/23/2018 04:22 PM    Chloride 101 10/23/2018 04:22 PM    CO2 26 10/23/2018 04:22 PM    Anion gap 5 11/08/2016 08:28 AM    Glucose 97 10/23/2018 04:22 PM    BUN 12 10/23/2018 04:22 PM    Creatinine 0.83 10/23/2018 04:22 PM    BUN/Creatinine ratio 14 10/23/2018 04:22 PM    GFR est AA 96 10/23/2018 04:22 PM    GFR est non-AA 84 10/23/2018 04:22 PM    Calcium 9.9 10/23/2018 04:22 PM       CT of chest/abdomen/pelvis (4/4/18)  CHEST:   Chest wall/thoracic inlet: Within normal limits. Thyroid: Within normal limits. Mediastinum/ida: Within normal limits. Heart/vessels: Within normal limits. Lungs/Pleura: Within normal limits. ABDOMEN:   Liver: Within normal limits. Gallbladder/Biliary: Within normal limits. Spleen: Within normal limits. Pancreas: Within normal limits. Adrenals: Within normal limits. Kidneys: Within normal limits. Peritoneum/Mesenteries: Within normal limits. Extraperitoneum: Within normal limits. Gastrointestinal tract: Small bowel is normal in course and caliber. There is   diverticulosis of the colon, with no evidence of acute diverticulitis. Vascular: Within normal limits. PELVIS:   Extraperitoneum: Within normal limits. Ureters: Within normal limits. Bladder: Within normal limits. Reproductive System: Status post hysterectomy. Additionally, the ovaries are not   visualized and may also be surgically absent. There is no evidence of pelvic   lymphadenopathy or recurrent mass at the surgical clips in the lower pelvis. Furthermore, the appearance at the surgical site appears not significantly   changed in comparison to the prior study. MSK: Within normal limits. IMPRESSION:   1. No evidence of metastatic disease within the chest, abdomen, or pelvis. 2. Stable appearance of the postsurgical changes in the pelvis, status post   hysterectomy.  No pelvic mass or lymphadenopathy identified. 3. No acute abnormality. IMPRESSION/PLAN:  Stacia Dotson is a 52 y.o. female with a diagnosis of stage III, grade 3, endometrial cancer. She has no evidence of disease on today's exam.  We will see her back in 6 months for continued surveillance of disease.           Signed By: Rita Alvares MD     8/27/2019/3:17 PM

## 2020-05-14 ENCOUNTER — HOSPITAL ENCOUNTER (OUTPATIENT)
Dept: CT IMAGING | Age: 50
Discharge: HOME | End: 2020-05-14
Attending: RADIOLOGY
Payer: COMMERCIAL

## 2020-05-14 DIAGNOSIS — C54.1: ICD-10-CM

## 2020-05-14 PROCEDURE — 74011636320 HC RX REV CODE- 636/320: Performed by: RADIOLOGY

## 2020-05-14 PROCEDURE — 74011000258 HC RX REV CODE- 258: Performed by: RADIOLOGY

## 2020-05-14 PROCEDURE — 74177 CT ABD & PELVIS W/CONTRAST: CPT

## 2020-05-14 RX ORDER — SODIUM CHLORIDE 0.9 % (FLUSH) 0.9 %
10 SYRINGE (ML) INJECTION
Status: COMPLETED | OUTPATIENT
Start: 2020-05-14 | End: 2020-05-14

## 2020-05-14 RX ADMIN — IOPAMIDOL 100 ML: 755 INJECTION, SOLUTION INTRAVENOUS at 15:30

## 2020-05-14 RX ADMIN — Medication 10 ML: at 15:30

## 2020-05-14 RX ADMIN — SODIUM CHLORIDE 100 ML: 900 INJECTION, SOLUTION INTRAVENOUS at 15:30

## 2020-06-30 NOTE — PROGRESS NOTES
3 month follow up for endometrial cancer ,  pt reports no abnormal spotting or bleeding, pt states she has no questions or concerns for today's visit    1. Have you been to the ER, urgent care clinic since your last visit? Hospitalized since your last visit?  no    2. Have you seen or consulted any other health care providers outside of the [address] [place] [address] since your last visit? Include any pap smears or colon screening.    no

## 2020-07-02 ENCOUNTER — OFFICE VISIT (OUTPATIENT)
Dept: GYNECOLOGY | Age: 50
End: 2020-07-02

## 2020-07-02 VITALS
HEART RATE: 55 BPM | SYSTOLIC BLOOD PRESSURE: 112 MMHG | BODY MASS INDEX: 29.88 KG/M2 | TEMPERATURE: 96.9 F | WEIGHT: 168.6 LBS | HEIGHT: 63 IN | DIASTOLIC BLOOD PRESSURE: 69 MMHG

## 2020-07-02 DIAGNOSIS — Z12.31 SCREENING MAMMOGRAM, ENCOUNTER FOR: ICD-10-CM

## 2020-07-02 DIAGNOSIS — C54.1 ENDOMETRIAL CANCER (HCC): Primary | ICD-10-CM

## 2020-07-02 RX ORDER — FLUTICASONE FUROATE AND VILANTEROL TRIFENATATE 200; 25 UG/1; UG/1
POWDER RESPIRATORY (INHALATION)
COMMUNITY
Start: 2020-04-12

## 2020-07-02 NOTE — PROGRESS NOTES
[address] [place] [address] [address], [address] [facility]  P [phone]  F [phone]    Office Note  Patient ID:  Name:  Garrett Trujillo  MRN:  [id number]  :  1970/50 y.o. Date:  2020       HISTORY OF PRESENT ILLNESS:  Garrett Trujillo is a 48 y.o.  postmenopausal female with a diagnosis of FIGO stage III, grade 3, endometrial carcinoma, diagnosed in 2015. At that time she underwent TLH, BSO, and staging. She is a former patient of Dr. Ramez Rick. She was recommended adjuvant Taxol/Carboplatin chemotherapy followed by pelvic radiation therapy. She presents today for surveillance. She is doing well and is without complaints. She denies any vaginal bleeding or discharge, pelvic or abdominal pain, or any changes in her bowel or bladder habits. She previously reported some dyspareunia, but that has improved with use of lubrication. ROS:   and GI review:  Negative  Cardiopulmonary review:  Negative   Musculoskeletal:  Negative    A comprehensive review of systems was negative except for that written in the History of Present Illness. , 10 point ROS        Problem List:  Patient Active Problem List    Diagnosis Date Noted    Endometrial cancer ([facility] [address] 2015    Abnormal uterine bleeding 2015    Endometrial thickening on ultra sound 2015     PMH:  Past Medical History:   Diagnosis Date    Asthma      had wheezing and used inhaler, no problems since    Hypothyroid     Uterine cancer ([facility] [address]       PSH:  Past Surgical History:   Procedure Laterality Date    HX GYN      \"surgery for hematoma near uterus\" laparoscopic    HX HYSTERECTOMY  2015    TLH    HX ORTHOPAEDIC Left 2019    knee    HX SALPINGO-OOPHORECTOMY  2015    BILATERAL      Social History:  Social History     Tobacco Use    Smoking status: Never Smoker    Smokeless tobacco: Never Used   Substance Use Topics    Alcohol use: No     Alcohol/week: 0.0 standard drinks      Family History:  Family History   Problem Relation Age of Onset    Anesth Problems Neg Hx       Medications: (reviewed)  Current Outpatient Medications   Medication Sig    Breo Ellipta 200-25 mcg/dose inhaler INHALE 1 PUFF POR V A ORAL ONCE A DAY    levothyroxine (SYNTHROID) 50 mcg tablet TAKE 1 TABLET BY MOUTH EVERY DAY    cyanocobalamin, vitamin B-12, (VITAMIN B-12 PO) Take  by mouth. No current facility-administered medications for this visit. Allergies: (reviewed)  Allergies   Allergen Reactions    Other Food Shortness of Breath and Swelling     mushrooms    Advil [Ibuprofen] Other (comments)     \"headache\"    Mushroom Combination No.1 Shortness of Breath and Swelling    Shellfish Derived Other (comments)     Dizziness headache. OBJECTIVE:soft, non-tender, without masses or organomegaly  Physical Exam:  VITAL SIGNS: Vitals:    07/02/20 1508   BP: 112/69   Pulse: (!) 55   Temp: 96.9 °F (36.1 °C)   TempSrc: Temporal   Weight: 168 lb 9.6 oz (76.5 kg)   Height: 5' 3\" (1.6 m)     Body mass index is 29.87 kg/m². GENERAL MEGGAN: Conversant, alert, oriented. No acute distress. HEENT: HEENT. No thyroid enlargement. No JVD. Neck: Supple without restrictions. RESPIRATORY: Clear to auscultation and percussion to the bases. No CVAT. CARDIOVASC: RRR without murmur/rub. GASTROINT: soft, non-tender, without masses or organomegaly   MUSCULOSKEL: no joint tenderness, deformity or swelling   EXTREMITIES: extremities normal, atraumatic, no cyanosis or edema   PELVIC: External genitalia: normal general appearance  Urinary system: urethral meatus normal  Vaginal: normal without tenderness, induration or masses  Cervix: absent  Adnexa: removed surgically  Uterus: absent   RECTAL: Deferred   [place] SURVEY: No suspicious lymphadenopathy or edema noted. NEURO: Grossly intact. No acute deficit.        Lab Data:    Lab Results   Component Value Date/Time    WBC 6.6 10/23/2018 04:22 PM    HGB 14.3 10/23/2018 04:22 PM    HCT 43.9 10/23/2018 04:22 PM    PLATELET 902 67/59/5938 04:22 PM    MCV 87 10/23/2018 04:22 PM     Lab Results   Component Value Date/Time    Sodium 143 10/23/2018 04:22 PM    Potassium 4.1 10/23/2018 04:22 PM    Chloride 101 10/23/2018 04:22 PM    CO2 26 10/23/2018 04:22 PM    Anion gap 5 11/08/2016 08:28 AM    Glucose 97 10/23/2018 04:22 PM    BUN 12 10/23/2018 04:22 PM    Creatinine 0.83 10/23/2018 04:22 PM    BUN/Creatinine ratio 14 10/23/2018 04:22 PM    GFR est AA 96 10/23/2018 04:22 PM    GFR est non-AA 84 10/23/2018 04:22 PM    Calcium 9.9 10/23/2018 04:22 PM       CT of chest/abdomen/pelvis (4/4/18)  CHEST:   Chest wall/thoracic inlet: Within normal limits. Thyroid: Within normal limits. Mediastinum/ida: Within normal limits. Heart/vessels: Within normal limits. Lungs/Pleura: Within normal limits. ABDOMEN:   Liver: Within normal limits. Gallbladder/Biliary: Within normal limits. Spleen: Within normal limits. Pancreas: Within normal limits. Adrenals: Within normal limits. Kidneys: Within normal limits. Peritoneum/Mesenteries: Within normal limits. Extraperitoneum: Within normal limits. Gastrointestinal tract: Small bowel is normal in course and caliber. There is   diverticulosis of the colon, with no evidence of acute diverticulitis. Vascular: Within normal limits. PELVIS:   Extraperitoneum: Within normal limits. Ureters: Within normal limits. Bladder: Within normal limits. Reproductive System: Status post hysterectomy. Additionally, the ovaries are not   visualized and may also be surgically absent. There is no evidence of pelvic   lymphadenopathy or recurrent mass at the surgical clips in the lower pelvis. Furthermore, the appearance at the surgical site appears not significantly   changed in comparison to the prior study. MSK: Within normal limits. IMPRESSION:   1.  No evidence of metastatic disease within the chest, abdomen, or pelvis. 2. Stable appearance of the postsurgical changes in the pelvis, status post   hysterectomy. No pelvic mass or lymphadenopathy identified. 3. No acute abnormality. IMPRESSION/PLAN:  Jessie Valdes is a 48 y.o. female with a diagnosis of stage III, grade 3, endometrial cancer. She has no evidence of disease on today's exam.  We will see her back in 6 months for continued surveillance of disease.           Signed By: Donnell Oseguera MD     7/2/2020/3:17 PM

## 2021-01-21 ENCOUNTER — OFFICE VISIT (OUTPATIENT)
Dept: GYNECOLOGY | Age: 51
End: 2021-01-21
Payer: COMMERCIAL

## 2021-01-21 VITALS
HEIGHT: 63 IN | WEIGHT: 170.6 LBS | SYSTOLIC BLOOD PRESSURE: 103 MMHG | BODY MASS INDEX: 30.23 KG/M2 | HEART RATE: 62 BPM | DIASTOLIC BLOOD PRESSURE: 67 MMHG

## 2021-01-21 DIAGNOSIS — C54.1 ENDOMETRIAL CANCER (HCC): Primary | ICD-10-CM

## 2021-01-21 PROCEDURE — 99214 OFFICE O/P EST MOD 30 MIN: CPT | Performed by: OBSTETRICS & GYNECOLOGY

## 2021-01-21 NOTE — PROGRESS NOTES
[address] [place] [address] [address], [address] [facility] [phone]  F [phone]    Office Note  Patient ID:  Name:  Simon Boyd  MRN:  [id number]  :  1970/51 y.o. Date:  2021       HISTORY OF PRESENT ILLNESS:  Simon Boyd is a 46 y.o.  postmenopausal female with a diagnosis of FIGO stage III, grade 3, endometrial carcinoma, diagnosed in 2015. At that time she underwent TLH, BSO, and staging. She is a former patient of Dr. Brie Paige. She was recommended adjuvant Taxol/Carboplatin chemotherapy followed by pelvic radiation therapy. She presents today for surveillance. She is doing well and is without complaints. She denies any vaginal bleeding or discharge, pelvic or abdominal pain, or any changes in her bowel or bladder habits. She previously reported some dyspareunia, but that has improved with use of lubrication. ROS:   and GI review:  Negative  Cardiopulmonary review:  Negative   Musculoskeletal:  Negative    A comprehensive review of systems was negative except for that written in the History of Present Illness. , 10 point ROS        Problem List:  Patient Active Problem List    Diagnosis Date Noted    Endometrial cancer ([facility] [address] 2015    Abnormal uterine bleeding 2015    Endometrial thickening on ultra sound 2015     PMH:  Past Medical History:   Diagnosis Date    Asthma      had wheezing and used inhaler, no problems since    Hypothyroid     Uterine cancer ([facility] [address]       PSH:  Past Surgical History:   Procedure Laterality Date    HX GYN      \"surgery for hematoma near uterus\" laparoscopic    HX HYSTERECTOMY  2015    TLH    HX ORTHOPAEDIC Left 2019    knee    HX SALPINGO-OOPHORECTOMY  2015    BILATERAL      Social History:  Social History     Tobacco Use    Smoking status: Never Smoker    Smokeless tobacco: Never Used   Substance Use Topics    Alcohol use: No     Alcohol/week: 0.0 standard drinks      Family History:  Family History   Problem Relation Age of Onset    Anesth Problems Neg Hx       Medications: (reviewed)  Current Outpatient Medications   Medication Sig    Breo Ellipta 200-25 mcg/dose inhaler INHALE 1 PUFF POR V A ORAL ONCE A DAY    cyanocobalamin, vitamin B-12, (VITAMIN B-12 PO) Take  by mouth.  levothyroxine (SYNTHROID) 50 mcg tablet TAKE 1 TABLET BY MOUTH EVERY DAY     No current facility-administered medications for this visit. Allergies: (reviewed)  Allergies   Allergen Reactions    Other Food Shortness of Breath and Swelling     mushrooms    Advil [Ibuprofen] Other (comments)     \"headache\"    Mushroom Combination No.1 Shortness of Breath and Swelling    Shellfish Derived Other (comments)     Dizziness headache. OBJECTIVE:soft, non-tender, without masses or organomegaly  Physical Exam:  VITAL SIGNS: Vitals:    01/21/21 1524   BP: 103/67   Pulse: 62   Weight: 170 lb 9.6 oz (77.4 kg)   Height: 5' 2.99\" (1.6 m)     Body mass index is 30.23 kg/m². GENERAL MEGGAN: Conversant, alert, oriented. No acute distress. HEENT: HEENT. No thyroid enlargement. No JVD. Neck: Supple without restrictions. RESPIRATORY: Clear to auscultation and percussion to the bases. No CVAT. CARDIOVASC: RRR without murmur/rub. GASTROINT: soft, non-tender, without masses or organomegaly   MUSCULOSKEL: no joint tenderness, deformity or swelling   EXTREMITIES: extremities normal, atraumatic, no cyanosis or edema   PELVIC: External genitalia: normal general appearance  Urinary system: urethral meatus normal  Vaginal: normal without tenderness, induration or masses  Cervix: absent  Adnexa: removed surgically  Uterus: absent   RECTAL: Deferred   [place] SURVEY: No suspicious lymphadenopathy or edema noted. NEURO: Grossly intact. No acute deficit.        Lab Data:    Lab Results   Component Value Date/Time    WBC 6.6 10/23/2018 04:22 PM    HGB 14.3 10/23/2018 04:22 PM    HCT 43.9 10/23/2018 04:22 PM    PLATELET 746 61/76/4852 04:22 PM    MCV 87 10/23/2018 04:22 PM     Lab Results   Component Value Date/Time    Sodium 143 10/23/2018 04:22 PM    Potassium 4.1 10/23/2018 04:22 PM    Chloride 101 10/23/2018 04:22 PM    CO2 26 10/23/2018 04:22 PM    Anion gap 5 11/08/2016 08:28 AM    Glucose 97 10/23/2018 04:22 PM    BUN 12 10/23/2018 04:22 PM    Creatinine 0.83 10/23/2018 04:22 PM    BUN/Creatinine ratio 14 10/23/2018 04:22 PM    GFR est AA 96 10/23/2018 04:22 PM    GFR est non-AA 84 10/23/2018 04:22 PM    Calcium 9.9 10/23/2018 04:22 PM       CT of chest/abdomen/pelvis (5/14/20)  LOWER THORAX: No significant abnormality in the incidentally imaged lower chest.  LIVER: No mass. BILIARY TREE: Gallbladder is within normal limits. CBD is not dilated. SPLEEN: within normal limits. PANCREAS: No mass or ductal dilatation. ADRENALS: Unremarkable. KIDNEYS: No mass, calculus, or hydronephrosis. STOMACH: Unremarkable. SMALL BOWEL: No dilatation or wall thickening. COLON: No dilatation or wall thickening. APPENDIX: Unremarkable. PERITONEUM: No ascites or pneumoperitoneum. RETROPERITONEUM: No lymphadenopathy or aortic aneurysm. REPRODUCTIVE ORGANS: Patient is status post hysterectomy and bilateral  oophorectomy. URINARY BLADDER: No mass or calculus. BONES: No destructive bone lesion. ABDOMINAL WALL: No mass or hernia. ADDITIONAL COMMENTS: N/A     IMPRESSION:  No acute abnormality identified. No adenopathy or mass is identified. IMPRESSION/PLAN:  Radha Figueroa is a 46 y.o. female with a diagnosis of stage III, grade 3, endometrial cancer. She has no evidence of disease on today's exam.  We will see her back in one year for continued surveillance of disease. An electronic signature was used to sign this note.     Annabella Kelly MD  01/21/21

## 2021-01-21 NOTE — PROGRESS NOTES
Six month check up for history of endometrial cancer Pt states no abnormal spotting, bleeding or pain. 1. Have you been to the ER, urgent care clinic since your last visit? Hospitalized since your last visit?no    2. Have you seen or consulted any other health care providers outside of the [address] [place] [address] since your last visit? Include any pap smears or colon screening.  no

## 2022-01-10 NOTE — PROGRESS NOTES
Check up. Pt states no abnormal spotting, bleeding or pain. 1. Have you been to the ER, urgent care clinic since your last visit? Hospitalized since your last visit?  no  2. Have you seen or consulted any other health care providers outside of the [address] [place] [address] since your last visit? Include any pap smears or colon screening.  no

## 2022-01-11 ENCOUNTER — OFFICE VISIT (OUTPATIENT)
Dept: GYNECOLOGY | Age: 52
End: 2022-01-11
Payer: COMMERCIAL

## 2022-01-11 VITALS
WEIGHT: 167.2 LBS | HEART RATE: 62 BPM | HEIGHT: 63 IN | SYSTOLIC BLOOD PRESSURE: 118 MMHG | DIASTOLIC BLOOD PRESSURE: 80 MMHG | BODY MASS INDEX: 29.62 KG/M2

## 2022-01-11 DIAGNOSIS — C54.1 ENDOMETRIAL CANCER (HCC): Primary | ICD-10-CM

## 2022-01-11 PROCEDURE — 99213 OFFICE O/P EST LOW 20 MIN: CPT | Performed by: OBSTETRICS & GYNECOLOGY

## 2022-01-11 NOTE — PROGRESS NOTES
[address] [place] [address] [address], [address] [place] [address] [phone]  F [phone]    Office Note  Patient ID:  Name:  Justyn Ng  MRN:  [id number]  :  1970/52 y.o. Date:  2022       HISTORY OF PRESENT ILLNESS:  Justyn Ng is a 46 y.o.  postmenopausal female with a diagnosis of FIGO stage III, grade 3, endometrial carcinoma, diagnosed in 2015. At that time she underwent TLH, BSO, and staging. She is a former patient of Dr. Agnes Bond. She was recommended adjuvant Taxol/Carboplatin chemotherapy followed by pelvic radiation therapy. She presents today for surveillance. She is doing well and is without complaints. She denies any vaginal bleeding or discharge, pelvic or abdominal pain, or any changes in her bowel or bladder habits. She previously reported some dyspareunia, but that has improved with use of lubrication. ROS:   and GI review:  Negative  Cardiopulmonary review:  Negative   Musculoskeletal:  Negative    A comprehensive review of systems was negative except for that written in the History of Present Illness. , 10 point ROS        Problem List:  Patient Active Problem List    Diagnosis Date Noted    Endometrial cancer ([facility] [address] 2015    Abnormal uterine bleeding 2015    Endometrial thickening on ultra sound 2015     PMH:  Past Medical History:   Diagnosis Date    Asthma      had wheezing and used inhaler, no problems since    Hypothyroid     Uterine cancer ([facility] [address]       PSH:  Past Surgical History:   Procedure Laterality Date    HX GYN      \"surgery for hematoma near uterus\" laparoscopic    HX HYSTERECTOMY  2015    TLH    HX ORTHOPAEDIC Left 2019    knee    HX SALPINGO-OOPHORECTOMY  2015    BILATERAL      Social History:  Social History     Tobacco Use    Smoking status: Never Smoker    Smokeless tobacco: Never Used   Substance Use Topics    Alcohol use: No     Alcohol/week: 0.0 standard drinks      Family History:  Family History   Problem Relation Age of Onset    Anesth Problems Neg Hx       Medications: (reviewed)  Current Outpatient Medications   Medication Sig    Breo Ellipta 200-25 mcg/dose inhaler INHALE 1 PUFF POR V A ORAL ONCE A DAY    cyanocobalamin, vitamin B-12, (VITAMIN B-12 PO) Take  by mouth.  levothyroxine (SYNTHROID) 50 mcg tablet TAKE 1 TABLET BY MOUTH EVERY DAY     No current facility-administered medications for this visit. Allergies: (reviewed)  Allergies   Allergen Reactions    Other Food Shortness of Breath and Swelling     mushrooms    Advil [Ibuprofen] Other (comments)     \"headache\"    Mushroom Combination No.1 Shortness of Breath and Swelling    Shellfish Derived Other (comments)     Dizziness headache. OBJECTIVE:soft, non-tender, without masses or organomegaly  Physical Exam:  VITAL SIGNS: Vitals:    01/11/22 1513   BP: 118/80   Pulse: 62   Weight: 167 lb 3.2 oz (75.8 kg)   Height: 5' 2.99\" (1.6 m)     Body mass index is 29.63 kg/m². GENERAL MEGGAN: Conversant, alert, oriented. No acute distress. HEENT: HEENT. No thyroid enlargement. No JVD. Neck: Supple without restrictions. RESPIRATORY: Clear to auscultation and percussion to the bases. No CVAT. CARDIOVASC: RRR without murmur/rub. GASTROINT: soft, non-tender, without masses or organomegaly   MUSCULOSKEL: no joint tenderness, deformity or swelling   EXTREMITIES: extremities normal, atraumatic, no cyanosis or edema   PELVIC: External genitalia: normal general appearance  Urinary system: urethral meatus normal  Vaginal: normal without tenderness, induration or masses  Cervix: absent  Adnexa: removed surgically  Uterus: absent   RECTAL: Deferred   [place] SURVEY: No suspicious lymphadenopathy or edema noted. NEURO: Grossly intact. No acute deficit.        Lab Data:    Lab Results   Component Value Date/Time    WBC 6.6 10/23/2018 04:22 PM    HGB 14.3 10/23/2018 04:22 PM    HCT 43.9 10/23/2018 04:22 PM    PLATELET 276 21/13/0900 04:22 PM    MCV 87 10/23/2018 04:22 PM     Lab Results   Component Value Date/Time    Sodium 143 10/23/2018 04:22 PM    Potassium 4.1 10/23/2018 04:22 PM    Chloride 101 10/23/2018 04:22 PM    CO2 26 10/23/2018 04:22 PM    Anion gap 5 11/08/2016 08:28 AM    Glucose 97 10/23/2018 04:22 PM    BUN 12 10/23/2018 04:22 PM    Creatinine 0.83 10/23/2018 04:22 PM    BUN/Creatinine ratio 14 10/23/2018 04:22 PM    GFR est AA 96 10/23/2018 04:22 PM    GFR est non-AA 84 10/23/2018 04:22 PM    Calcium 9.9 10/23/2018 04:22 PM       CT of chest/abdomen/pelvis (5/14/20)  LOWER THORAX: No significant abnormality in the incidentally imaged lower chest.  LIVER: No mass. BILIARY TREE: Gallbladder is within normal limits. CBD is not dilated. SPLEEN: within normal limits. PANCREAS: No mass or ductal dilatation. ADRENALS: Unremarkable. KIDNEYS: No mass, calculus, or hydronephrosis. STOMACH: Unremarkable. SMALL BOWEL: No dilatation or wall thickening. COLON: No dilatation or wall thickening. APPENDIX: Unremarkable. PERITONEUM: No ascites or pneumoperitoneum. RETROPERITONEUM: No lymphadenopathy or aortic aneurysm. REPRODUCTIVE ORGANS: Patient is status post hysterectomy and bilateral  oophorectomy. URINARY BLADDER: No mass or calculus. BONES: No destructive bone lesion. ABDOMINAL WALL: No mass or hernia. ADDITIONAL COMMENTS: N/A     IMPRESSION:  No acute abnormality identified. No adenopathy or mass is identified. IMPRESSION/PLAN:  Prosper Lanza is a 46 y.o. female with a diagnosis of stage III, grade 3, endometrial cancer. She has no evidence of disease on today's exam.  We will see her back in one year for continued surveillance of disease. An electronic signature was used to sign this note.     Jonh Schwartz MD  01/11/22

## 2023-01-17 ENCOUNTER — HOSPITAL ENCOUNTER (OUTPATIENT)
Dept: RADIATION THERAPY | Age: 53
Discharge: HOME | End: 2023-01-17

## 2023-02-02 ENCOUNTER — OFFICE VISIT (OUTPATIENT)
Dept: GYNECOLOGY | Age: 53
End: 2023-02-02
Payer: COMMERCIAL

## 2023-02-02 VITALS
HEIGHT: 63 IN | HEART RATE: 62 BPM | BODY MASS INDEX: 29.59 KG/M2 | SYSTOLIC BLOOD PRESSURE: 101 MMHG | DIASTOLIC BLOOD PRESSURE: 68 MMHG | WEIGHT: 167 LBS

## 2023-02-02 DIAGNOSIS — C54.1 ENDOMETRIAL CANCER (HCC): Primary | ICD-10-CM

## 2023-02-02 PROCEDURE — 99213 OFFICE O/P EST LOW 20 MIN: CPT | Performed by: OBSTETRICS & GYNECOLOGY

## 2023-02-02 RX ORDER — ATORVASTATIN CALCIUM 10 MG/1
TABLET, FILM COATED ORAL DAILY
COMMUNITY

## 2023-02-02 NOTE — PROGRESS NOTES
Check up. Pt states no abnormal spotting, bleeding or pain. 1. Have you been to the ER, urgent care clinic since your last visit? Hospitalized since your last visit? Treated for uti at [facility]. Symptoms have improved. 2. Have you seen or consulted any other health care providers outside of the [address] [place] [address] since your last visit? Include any pap smears or colon screening.  no

## 2023-02-02 NOTE — PROGRESS NOTES
[facility] [address]  P [phone]  F [phone]    Office Note  Patient ID:  Name:  Reilly Kerns  MRN:  [id number]  :  1970/53 y.o. Date:  2023       HISTORY OF PRESENT ILLNESS:  Reilly Kerns is a 48 y.o.  postmenopausal female with a diagnosis of FIGO stage III, grade 3, endometrial carcinoma, diagnosed in 2015. At that time she underwent TLH, BSO, and staging. She is a former patient of Dr. Judy Crowder. She was recommended adjuvant Taxol/Carboplatin chemotherapy followed by pelvic radiation therapy. She presents today for surveillance. She is doing well and is without complaints. She denies any vaginal bleeding or discharge, pelvic or abdominal pain, or any changes in her bowel or bladder habits. She previously reported some dyspareunia, but that has improved with use of lubrication. ROS:   and GI review:  Negative  Cardiopulmonary review:  Negative   Musculoskeletal:  Negative    A comprehensive review of systems was negative except for that written in the History of Present Illness. , 10 point ROS        Problem List:  Patient Active Problem List    Diagnosis Date Noted    Endometrial cancer ([facility] [address] 2015    Abnormal uterine bleeding 2015    Endometrial thickening on ultra sound 2015     PMH:  Past Medical History:   Diagnosis Date    Asthma      had wheezing and used inhaler, no problems since    Hypothyroid     Uterine cancer ([address]       PSH:  Past Surgical History:   Procedure Laterality Date    HX GYN      \"surgery for hematoma near uterus\" laparoscopic    HX HYSTERECTOMY  2015    [facility] [address]    HX ORTHOPAEDIC Left 2019    knee    HX SALPINGO-OOPHORECTOMY  2015    BILATERAL      Social History:  Social History     Tobacco Use    Smoking status: Never    Smokeless tobacco: Never   Substance Use Topics    Alcohol use: No     Alcohol/week: 0.0 standard drinks Family History:  Family History   Problem Relation Age of Onset    Anesth Problems Neg Hx       Medications: (reviewed)  Current Outpatient Medications   Medication Sig    atorvastatin (LIPITOR) 10 mg tablet Take  by mouth daily. Breo Ellipta 200-25 mcg/dose inhaler INHALE 1 PUFF POR V A ORAL ONCE A DAY    levothyroxine (SYNTHROID) 50 mcg tablet TAKE 1 TABLET BY MOUTH EVERY DAY    cyanocobalamin, vitamin B-12, (VITAMIN B-12 PO) Take  by mouth. (Patient not taking: Reported on 2/2/2023)     No current facility-administered medications for this visit. Allergies: (reviewed)  Allergies   Allergen Reactions    Other Food Shortness of Breath and Swelling     mushrooms    Advil [Ibuprofen] Other (comments)     \"headache\"    Mushroom Combination No.1 Shortness of Breath and Swelling    Shellfish Derived Other (comments)     Dizziness headache. OBJECTIVE:soft, non-tender, without masses or organomegaly  Physical Exam:  VITAL SIGNS: Vitals:    02/02/23 1522   BP: 101/68   Pulse: 62   Weight: 167 lb (75.8 kg)   Height: 5' 2.99\" (1.6 m)       Body mass index is 29.59 kg/m². GENERAL MEGGAN: Conversant, alert, oriented. No acute distress. HEENT: HEENT. No thyroid enlargement. No JVD. Neck: Supple without restrictions. RESPIRATORY: Clear to auscultation and percussion to the bases. No CVAT. CARDIOVASC: RRR without murmur/rub. GASTROINT: soft, non-tender, without masses or organomegaly   MUSCULOSKEL: no joint tenderness, deformity or swelling   EXTREMITIES: extremities normal, atraumatic, no cyanosis or edema   PELVIC: External genitalia: normal general appearance  Urinary system: urethral meatus normal  Vaginal: normal without tenderness, induration or masses  Cervix: absent  Adnexa: removed surgically  Uterus: absent   RECTAL: Deferred   [place] SURVEY: No suspicious lymphadenopathy or edema noted. NEURO: Grossly intact. No acute deficit.        Lab Data:    Lab Results   Component Value Date/Time    WBC 6.6 10/23/2018 04:22 PM    HGB 14.3 10/23/2018 04:22 PM    HCT 43.9 10/23/2018 04:22 PM    PLATELET 338 20/83/4383 04:22 PM    MCV 87 10/23/2018 04:22 PM     Lab Results   Component Value Date/Time    Sodium 143 10/23/2018 04:22 PM    Potassium 4.1 10/23/2018 04:22 PM    Chloride 101 10/23/2018 04:22 PM    CO2 26 10/23/2018 04:22 PM    Anion gap 5 11/08/2016 08:28 AM    Glucose 97 10/23/2018 04:22 PM    BUN 12 10/23/2018 04:22 PM    Creatinine 0.83 10/23/2018 04:22 PM    BUN/Creatinine ratio 14 10/23/2018 04:22 PM    GFR est AA 96 10/23/2018 04:22 PM    GFR est non-AA 84 10/23/2018 04:22 PM    Calcium 9.9 10/23/2018 04:22 PM       CT of chest/abdomen/pelvis (5/14/20)  LOWER THORAX: No significant abnormality in the incidentally imaged lower chest.  LIVER: No mass. BILIARY TREE: Gallbladder is within normal limits. CBD is not dilated. SPLEEN: within normal limits. PANCREAS: No mass or ductal dilatation. ADRENALS: Unremarkable. KIDNEYS: No mass, calculus, or hydronephrosis. STOMACH: Unremarkable. SMALL BOWEL: No dilatation or wall thickening. COLON: No dilatation or wall thickening. APPENDIX: Unremarkable. PERITONEUM: No ascites or pneumoperitoneum. RETROPERITONEUM: No lymphadenopathy or aortic aneurysm. REPRODUCTIVE ORGANS: Patient is status post hysterectomy and bilateral  oophorectomy. URINARY BLADDER: No mass or calculus. BONES: No destructive bone lesion. ABDOMINAL WALL: No mass or hernia. ADDITIONAL COMMENTS: N/A     IMPRESSION:  No acute abnormality identified. No adenopathy or mass is identified. IMPRESSION/PLAN:  Indio Love is a 48 y.o. female with a diagnosis of stage III, grade 3, endometrial cancer. She has no evidence of disease on today's exam.  We will see her back in one year for continued surveillance of disease. An electronic signature was used to sign this note.     Christoph Quintero MD  02/02/23

## 2024-02-08 ENCOUNTER — OFFICE VISIT (OUTPATIENT)
Age: 54
End: 2024-02-08
Payer: COMMERCIAL

## 2024-02-08 VITALS
HEART RATE: 59 BPM | SYSTOLIC BLOOD PRESSURE: 104 MMHG | HEIGHT: 63 IN | WEIGHT: 169 LBS | BODY MASS INDEX: 29.95 KG/M2 | DIASTOLIC BLOOD PRESSURE: 68 MMHG

## 2024-02-08 DIAGNOSIS — Z85.42 HISTORY OF ENDOMETRIAL CANCER: Primary | ICD-10-CM

## 2024-02-08 DIAGNOSIS — Z12.31 SCREENING MAMMOGRAM, ENCOUNTER FOR: ICD-10-CM

## 2024-02-08 PROCEDURE — 99212 OFFICE O/P EST SF 10 MIN: CPT | Performed by: OBSTETRICS & GYNECOLOGY

## 2024-02-08 NOTE — PROGRESS NOTES
Check up for history of endometrial cancer. Pt states no abnormal spotting, bleeding or pain.  1. Have you been to the ER, urgent care clinic since your last visit?  Hospitalized since your last visit?no    2. Have you seen or consulted any other health care providers outside of the [facility] since your last visit?  Include any pap smears or colon screening. no

## 2024-02-08 NOTE — PROGRESS NOTES
[facility] GYNECOLOGIC ONCOLOGY  [address] [place] [address], [facility]  [place] [address]  P [phone]  F [phone]    Office Note  Patient ID:  Name:  Shell Rosenthal  MRN:  [id number]  :  1970/54 y.o.  Date:  2024      HISTORY OF PRESENT ILLNESS:  Shell Rosenthal is a 54 y.o.  postmenopausal female with a diagnosis of FIGO stage III, grade 3, endometrial carcinoma, diagnosed in 2015.  At that time she underwent TLH, BSO, and staging.  She is a former patient of Dr. Eduardo Ruelas.  She was recommended adjuvant Taxol/Carboplatin chemotherapy followed by pelvic radiation therapy.       She presents today for surveillance.  She is doing well and is without complaints.  She denies any vaginal bleeding or discharge, pelvic or abdominal pain, or any changes in her bowel or bladder habits.  She previously reported some dyspareunia, but that has improved with use of lubrication.        ROS:   and GI review:  Negative  Cardiopulmonary review:  Negative   Musculoskeletal:  Negative     A comprehensive review of systems was negative except for that written in the History of Present Illness., 10 point ROS      Problem List:  Patient Active Problem List    Diagnosis Date Noted    Endometrial cancer (HCC) 2015    Abnormal uterine bleeding 2015     PMH:  Past Medical History:   Diagnosis Date    Asthma      had wheezing and used inhaler, no problems since    Hypothyroid     Uterine cancer (HCC)       PSH:  Past Surgical History:   Procedure Laterality Date    GYN  2012    \"surgery for hematoma near uterus\" laparoscopic    HYSTERECTOMY (CERVIX STATUS UNKNOWN)  2015    [facility]    ORTHOPEDIC SURGERY Left 2019    knee    SALPINGO-OOPHORECTOMY  2015    BILATERAL      Social History:  Social History     Tobacco Use    Smoking status: Never    Smokeless tobacco: Never   Substance Use Topics    Alcohol use: No     Alcohol/week: 0.0 standard drinks of alcohol      Family

## 2025-03-03 NOTE — PROGRESS NOTES
Check up. Pt states no abnormal spotting, bleeding or pain. Patient c/o pain in her LLQ when walking.   1. Have you been to the ER, urgent care clinic since your last visit?  Hospitalized since your last visit?no    2. Have you seen or consulted any other health care providers outside of the [facility] since your last visit?  Include any pap smears or colon screening. no

## 2025-03-04 ENCOUNTER — SOCIAL WORK (OUTPATIENT)
Age: 55
End: 2025-03-04

## 2025-03-04 ENCOUNTER — OFFICE VISIT (OUTPATIENT)
Age: 55
End: 2025-03-04

## 2025-03-04 VITALS
SYSTOLIC BLOOD PRESSURE: 109 MMHG | DIASTOLIC BLOOD PRESSURE: 72 MMHG | WEIGHT: 152 LBS | BODY MASS INDEX: 26.93 KG/M2 | HEIGHT: 63 IN | HEART RATE: 60 BPM

## 2025-03-04 DIAGNOSIS — Z85.42 HISTORY OF ENDOMETRIAL CANCER: Primary | ICD-10-CM

## 2025-03-04 PROCEDURE — 99212 OFFICE O/P EST SF 10 MIN: CPT | Performed by: OBSTETRICS & GYNECOLOGY

## 2025-03-04 ASSESSMENT — PATIENT HEALTH QUESTIONNAIRE - PHQ9
SUM OF ALL RESPONSES TO PHQ QUESTIONS 1-9: 0
1. LITTLE INTEREST OR PLEASURE IN DOING THINGS: NOT AT ALL
SUM OF ALL RESPONSES TO PHQ QUESTIONS 1-9: 0
2. FEELING DOWN, DEPRESSED OR HOPELESS: NOT AT ALL
SUM OF ALL RESPONSES TO PHQ QUESTIONS 1-9: 0
SUM OF ALL RESPONSES TO PHQ QUESTIONS 1-9: 0

## 2025-03-04 NOTE — PROGRESS NOTES
Planning: ACP conversation deferred at this time    Sources of Information: Patient    Mental Status: Alert, Oriented to Person, Oriented to Place, Oriented to Time, Oriented to Situation    Relationship Status:     Living Circumstances: Family/Significant Other in Household    Employment Status: Employed Part-time    Transportation Resources: Self    Barriers to Learning: Language Barriers    Financial/Legal Concerns: Medical Debt    Worship/Spiritual/Existential: Conversation deferred    Support System: Strong Support: The patient has a reliable, consistent, and engaged network of family, friends, or community resources that effectively meet their emotional, social, and practical needs.  Patient's Primary Support Person/Group: Brother    History of Mental Health / Substance Use Disorders: Unknown  Reviewed patient's mental health/substance use history; identified the following: Anxiety about divorce    Coping with Illness: Situational Anxiety           Concerns/Barriers to Care: Uninsured, emotional support    Narrative: LMSW met with patient following Distress Thermometer screen. LMSW introduced SW role. LMSW asked patient to confirm PHI preferences and Emergency Contact as current contact is spouse but patient's Distress Thermometer noted that patient is undergoing a divorce. Patient changed emergency contact to her brother. Relevant form completed by patient. LMSW inquired about additional needs. Patient stated that she lost her insurance as her job went from full time to part time. LMSW provided information on [facility]  to patient. [facility] for both divorce legal needs and medical debt concerns. Patient expressed concerns about self-care. LMSW reviewed available resources with [facility]. Patient affirmed strong support from family.          Information/Education/Referrals Provided:    Complimentary/Integrative Therapies  Insurance/Entitlements

## 2025-03-04 NOTE — PROGRESS NOTES
[facility] GYNECOLOGIC ONCOLOGY  [address] [place] [address], [facility]  [place] [address]  P [phone]  F [phone]    Office Note  Patient ID:  Name:  Shell Rosenthal  MRN:  [id number]  :  1970/55 y.o.  Date:  3/4/2025      HISTORY OF PRESENT ILLNESS:  Shell Rosenthal is a 55 y.o.  postmenopausal female with a diagnosis of FIGO stage III, grade 3, endometrial carcinoma, diagnosed in 2015.  At that time she underwent TLH, BSO, and staging.  She is a former patient of Dr. Eduardo Ruelas.  She was recommended adjuvant Taxol/Carboplatin chemotherapy followed by pelvic radiation therapy.       She presents today for surveillance.  She is doing well and is without complaints.  She denies any vaginal bleeding or discharge, pelvic or abdominal pain, or any changes in her bowel or bladder habits.          ROS:   and GI review:  Negative  Cardiopulmonary review:  Negative   Musculoskeletal:  Negative     A comprehensive review of systems was negative except for that written in the History of Present Illness., 10 point ROS      Problem List:  Patient Active Problem List    Diagnosis Date Noted    Endometrial cancer (HCC) 2015    Abnormal uterine bleeding 2015     PMH:  Past Medical History:   Diagnosis Date    Asthma     2013 had wheezing and used inhaler, no problems since    Hypothyroid     Uterine cancer (HCC)       PSH:  Past Surgical History:   Procedure Laterality Date    GYN      \"surgery for hematoma near uterus\" laparoscopic    HYSTERECTOMY (CERVIX STATUS UNKNOWN)  2015    [facility]    ORTHOPEDIC SURGERY Left 2019    knee    SALPINGO-OOPHORECTOMY  2015    BILATERAL      Social History:  Social History     Tobacco Use    Smoking status: Never    Smokeless tobacco: Never   Substance Use Topics    Alcohol use: No     Alcohol/week: 0.0 standard drinks of alcohol      Family History:  Family History   Problem Relation Age of Onset    Anesth Problems Neg Hx       Medications:

## 2025-06-11 ENCOUNTER — HOSPITAL ENCOUNTER (OUTPATIENT)
Facility: HOSPITAL | Age: 55
Setting detail: SPECIMEN
Discharge: HOME | End: 2025-06-14

## 2025-06-11 LAB
ALBUMIN SERPL-MCNC: 3.6 G/DL (ref 3.5–5)
ALBUMIN/GLOB SERPL: 1 (ref 1.1–2.2)
ALP SERPL-CCNC: 147 U/L (ref 45–117)
ALT SERPL-CCNC: 30 U/L (ref 12–78)
ANION GAP SERPL CALC-SCNC: 7 MMOL/L (ref 2–12)
AST SERPL-CCNC: 30 U/L (ref 15–37)
BASOPHILS # BLD: 0.08 K/UL (ref 0–0.1)
BASOPHILS NFR BLD: 1.1 % (ref 0–1)
BILIRUB SERPL-MCNC: 0.4 MG/DL (ref 0.2–1)
BUN SERPL-MCNC: 13 MG/DL (ref 6–20)
BUN/CREAT SERPL: 18 (ref 12–20)
CALCIUM SERPL-MCNC: 9.5 MG/DL (ref 8.5–10.1)
CHLORIDE SERPL-SCNC: 105 MMOL/L (ref 97–108)
CHOLEST SERPL-MCNC: 153 MG/DL
CO2 SERPL-SCNC: 27 MMOL/L (ref 21–32)
CREAT SERPL-MCNC: 0.73 MG/DL (ref 0.55–1.02)
DIFFERENTIAL METHOD BLD: ABNORMAL
EOSINOPHIL # BLD: 0.1 K/UL (ref 0–0.4)
EOSINOPHIL NFR BLD: 1.4 % (ref 0–7)
ERYTHROCYTE [DISTWIDTH] IN BLOOD BY AUTOMATED COUNT: 13.2 % (ref 11.5–14.5)
EST. AVERAGE GLUCOSE BLD GHB EST-MCNC: 103 MG/DL
GLOBULIN SER CALC-MCNC: 3.6 G/DL (ref 2–4)
GLUCOSE SERPL-MCNC: 85 MG/DL (ref 65–100)
HBA1C MFR BLD: 5.2 % (ref 4–5.6)
HCT VFR BLD AUTO: 41.7 % (ref 35–47)
HDLC SERPL-MCNC: 50 MG/DL
HDLC SERPL: 3.1 (ref 0–5)
HGB BLD-MCNC: 13.7 G/DL (ref 11.5–16)
IMM GRANULOCYTES # BLD AUTO: 0.01 K/UL (ref 0–0.04)
IMM GRANULOCYTES NFR BLD AUTO: 0.1 % (ref 0–0.5)
LDLC SERPL CALC-MCNC: 79.2 MG/DL (ref 0–100)
LYMPHOCYTES # BLD: 2.38 K/UL (ref 0.8–3.5)
LYMPHOCYTES NFR BLD: 33.1 % (ref 12–49)
MCH RBC QN AUTO: 28.9 PG (ref 26–34)
MCHC RBC AUTO-ENTMCNC: 32.9 G/DL (ref 30–36.5)
MCV RBC AUTO: 88 FL (ref 80–99)
MONOCYTES # BLD: 0.45 K/UL (ref 0–1)
MONOCYTES NFR BLD: 6.3 % (ref 5–13)
NEUTS SEG # BLD: 4.16 K/UL (ref 1.8–8)
NEUTS SEG NFR BLD: 58 % (ref 32–75)
NRBC # BLD: 0 K/UL (ref 0–0.01)
NRBC BLD-RTO: 0 PER 100 WBC
PLATELET # BLD AUTO: 312 K/UL (ref 150–400)
PMV BLD AUTO: 9.9 FL (ref 8.9–12.9)
POTASSIUM SERPL-SCNC: 4.1 MMOL/L (ref 3.5–5.1)
PROT SERPL-MCNC: 7.2 G/DL (ref 6.4–8.2)
RBC # BLD AUTO: 4.74 M/UL (ref 3.8–5.2)
SODIUM SERPL-SCNC: 139 MMOL/L (ref 136–145)
TRIGL SERPL-MCNC: 119 MG/DL
TSH SERPL DL<=0.05 MIU/L-ACNC: 1.19 UIU/ML (ref 0.36–3.74)
VLDLC SERPL CALC-MCNC: 23.8 MG/DL
WBC # BLD AUTO: 7.2 K/UL (ref 3.6–11)

## 2025-06-11 PROCEDURE — 80061 LIPID PANEL: CPT

## 2025-06-11 PROCEDURE — 85025 COMPLETE CBC W/AUTO DIFF WBC: CPT

## 2025-06-11 PROCEDURE — 84443 ASSAY THYROID STIM HORMONE: CPT

## 2025-06-11 PROCEDURE — 80053 COMPREHEN METABOLIC PANEL: CPT

## 2025-06-11 PROCEDURE — 83036 HEMOGLOBIN GLYCOSYLATED A1C: CPT

## 2025-06-13 ENCOUNTER — TRANSCRIBE ORDERS (OUTPATIENT)
Facility: HOSPITAL | Age: 55
End: 2025-06-13

## 2025-06-13 DIAGNOSIS — Z12.31 OTHER SCREENING MAMMOGRAM: Primary | ICD-10-CM

## 2025-06-24 ENCOUNTER — HOSPITAL ENCOUNTER (OUTPATIENT)
Facility: HOSPITAL | Age: 55
Discharge: HOME | End: 2025-06-27

## 2025-06-24 VITALS — HEIGHT: 64 IN | WEIGHT: 147 LBS | BODY MASS INDEX: 25.1 KG/M2

## 2025-06-24 DIAGNOSIS — Z12.31 OTHER SCREENING MAMMOGRAM: ICD-10-CM

## 2025-06-24 PROCEDURE — 77063 BREAST TOMOSYNTHESIS BI: CPT
